# Patient Record
Sex: FEMALE | Race: WHITE | NOT HISPANIC OR LATINO | Employment: FULL TIME
[De-identification: names, ages, dates, MRNs, and addresses within clinical notes are randomized per-mention and may not be internally consistent; named-entity substitution may affect disease eponyms.]

---

## 2019-03-22 ENCOUNTER — HEALTH MAINTENANCE LETTER (OUTPATIENT)
Age: 49
End: 2019-03-22

## 2019-05-22 ENCOUNTER — OFFICE VISIT (OUTPATIENT)
Dept: URGENT CARE | Facility: URGENT CARE | Age: 49
End: 2019-05-22
Payer: COMMERCIAL

## 2019-05-22 VITALS
OXYGEN SATURATION: 98 % | HEART RATE: 76 BPM | SYSTOLIC BLOOD PRESSURE: 110 MMHG | DIASTOLIC BLOOD PRESSURE: 68 MMHG | TEMPERATURE: 98.3 F | RESPIRATION RATE: 18 BRPM

## 2019-05-22 DIAGNOSIS — S05.01XA ABRASION OF RIGHT CORNEA, INITIAL ENCOUNTER: Primary | ICD-10-CM

## 2019-05-22 PROCEDURE — 99213 OFFICE O/P EST LOW 20 MIN: CPT | Performed by: PHYSICIAN ASSISTANT

## 2019-05-22 RX ORDER — OFLOXACIN 3 MG/ML
SOLUTION/ DROPS OPHTHALMIC
Qty: 4 ML | Refills: 0 | Status: SHIPPED | OUTPATIENT
Start: 2019-05-22 | End: 2019-05-29

## 2019-05-22 NOTE — PROGRESS NOTES
SUBJECTIVE:  Chief Complaint:   Chief Complaint   Patient presents with     Urgent Care     Eye Problem     R eye pain scratched      History of Present Illness:  Luz Sharpe is a 48 year old female who presents complaining of mild right eyes pain for 1 day(s).   Onset/timing: sudden.    Associated Signs and Symptoms: none  Treatment measures tried include: flushed with water   Contact wearer : Yes    No past medical history on file.  No current outpatient medications on file.        ROS:  Review of systems negative except as stated above.    OBJECTIVE:  /68   Pulse 76   Temp 98.3  F (36.8  C)   Resp 18   SpO2 98%   General: no acute distress  Eye exam: right eye normal lid, conjunctiva, cornea, pupil and fundus, left eye abnormal findings: flourscein uptake .  Ears: normal canals, TMs bilaterally, normal TM mobility  Nose: NORMAL - no drainage, turbinates normal in size.  Neck: supple, non-tender, free range of motion, no adenopathy  Heart: NORMAL - regular rate and rhythm without murmur.  Lungs: normal and clear to auscultation    ASSESSMENT:  (S05.01XA) Abrasion of right cornea, initial encounter  (primary encounter diagnosis)  Comment: Covering for contact wearing  Plan: ofloxacin (OCUFLOX) 0.3 % ophthalmic solution  Follow up with ophtho tomorrow    Patient Instructions   Follow up with optometry if not improving in 24-36 hours      Patient Education     Corneal Abrasion    You have received a scratch or scrape (abrasion) to your cornea. The cornea is the clear part in the front of the eye. This sensitive area is very painful when injured. You may make tears frequently, and your vision may be blurry until the injury heals. You may be sensitive to light.  This part of the body heals quickly. You can expect the pain to go away within 24 to 48 hours. If the abrasion is large or deep, your doctor may apply an eye patch, although this is not always done. An antibiotic ointment or eye drops may also be  used to prevent infection.  Numbing drops may be used to relieve the pain temporarily so that your eyes can be examined. But these drops can t be prescribed for home use because that would prevent healing and lead to more serious problems. Also, if you can t feel your eye, there is a chance of accidentally injuring it further without knowing it.  Home care    A cold pack may be applied over the eye (or eye patch) for 20 minutes at a time, to reduce pain. To make a cold pack, put ice cubes in a plastic bag that seals at the top. Wrap the bag in a clean, thin towel or cloth.    You may use acetaminophen or ibuprofen to control pain, unless another pain medicine was prescribed. Note: If you have chronic liver or kidney disease or have ever had a stomach ulcer or GI bleeding, talk with your doctor before using these medicines.    Rest your eyes and don t read until symptoms are gone.    If you use contact lenses, don t wear them until all symptoms are gone.    If your vision is affected by the corneal abrasion or if an eye patch was applied, don t drive a motor vehicle or operate machinery until all symptoms are gone. You may have trouble judging distances using only one eye.    If your eyes are sensitive to light, try wearing sunglasses, or stay indoors until symptoms go away.  Follow-up care  Follow up with your healthcare provider, or as advised.    If no patch was put on your eye and the pain continues for more than 48 hours, you should have another exam. Contact your healthcare provider to arrange this.    If your eye was patched and you were asked to remove the patch yourself, see your healthcare provider. Contact your healthcare provider if you still have pain after the patch is removed.    If you were given a return appointment for patch removal and re-examination, be sure to keep the appointment. Leaving the patch in place longer than advised could be harmful.  When to seek medical advice  Call your healthcare  provider right away if any of these occur.    Increasing eye pain or pain that does not improve after 24 hours    Discharge from the eye    Increasing redness of the eye or swelling of the eyelids    Worsening vision    Symptoms get worse after the abrasion has healed  Date Last Reviewed: 7/1/2017 2000-2018 [employer]. [address] [place] [id number]. All rights reserved. This information is not intended as a substitute for professional medical care. Always follow your healthcare professional's instructions.

## 2019-05-23 NOTE — PATIENT INSTRUCTIONS
Follow up with optometry if not improving in 24-36 hours      Patient Education     Corneal Abrasion    You have received a scratch or scrape (abrasion) to your cornea. The cornea is the clear part in the front of the eye. This sensitive area is very painful when injured. You may make tears frequently, and your vision may be blurry until the injury heals. You may be sensitive to light.  This part of the body heals quickly. You can expect the pain to go away within 24 to 48 hours. If the abrasion is large or deep, your doctor may apply an eye patch, although this is not always done. An antibiotic ointment or eye drops may also be used to prevent infection.  Numbing drops may be used to relieve the pain temporarily so that your eyes can be examined. But these drops can t be prescribed for home use because that would prevent healing and lead to more serious problems. Also, if you can t feel your eye, there is a chance of accidentally injuring it further without knowing it.  Home care    A cold pack may be applied over the eye (or eye patch) for 20 minutes at a time, to reduce pain. To make a cold pack, put ice cubes in a plastic bag that seals at the top. Wrap the bag in a clean, thin towel or cloth.    You may use acetaminophen or ibuprofen to control pain, unless another pain medicine was prescribed. Note: If you have chronic liver or kidney disease or have ever had a stomach ulcer or GI bleeding, talk with your doctor before using these medicines.    Rest your eyes and don t read until symptoms are gone.    If you use contact lenses, don t wear them until all symptoms are gone.    If your vision is affected by the corneal abrasion or if an eye patch was applied, don t drive a motor vehicle or operate machinery until all symptoms are gone. You may have trouble judging distances using only one eye.    If your eyes are sensitive to light, try wearing sunglasses, or stay indoors until symptoms go away.  Follow-up  care  Follow up with your healthcare provider, or as advised.    If no patch was put on your eye and the pain continues for more than 48 hours, you should have another exam. Contact your healthcare provider to arrange this.    If your eye was patched and you were asked to remove the patch yourself, see your healthcare provider. Contact your healthcare provider if you still have pain after the patch is removed.    If you were given a return appointment for patch removal and re-examination, be sure to keep the appointment. Leaving the patch in place longer than advised could be harmful.  When to seek medical advice  Call your healthcare provider right away if any of these occur.    Increasing eye pain or pain that does not improve after 24 hours    Discharge from the eye    Increasing redness of the eye or swelling of the eyelids    Worsening vision    Symptoms get worse after the abrasion has healed  Date Last Reviewed: 7/1/2017 2000-2018 [employer]. [address] [place] [address], [place] [address]. All rights reserved. This information is not intended as a substitute for professional medical care. Always follow your healthcare professional's instructions.

## 2019-11-04 ENCOUNTER — HEALTH MAINTENANCE LETTER (OUTPATIENT)
Age: 49
End: 2019-11-04

## 2020-11-16 ENCOUNTER — HEALTH MAINTENANCE LETTER (OUTPATIENT)
Age: 50
End: 2020-11-16

## 2021-01-01 ENCOUNTER — TRANSFERRED RECORDS (OUTPATIENT)
Dept: MULTI SPECIALTY CLINIC | Facility: CLINIC | Age: 51
End: 2021-01-01

## 2021-01-15 ENCOUNTER — HEALTH MAINTENANCE LETTER (OUTPATIENT)
Age: 51
End: 2021-01-15

## 2021-03-04 ENCOUNTER — OFFICE VISIT (OUTPATIENT)
Dept: FAMILY MEDICINE | Facility: CLINIC | Age: 51
End: 2021-03-04
Payer: COMMERCIAL

## 2021-03-04 VITALS
TEMPERATURE: 98 F | DIASTOLIC BLOOD PRESSURE: 76 MMHG | WEIGHT: 147 LBS | HEART RATE: 77 BPM | OXYGEN SATURATION: 100 % | SYSTOLIC BLOOD PRESSURE: 108 MMHG

## 2021-03-04 DIAGNOSIS — R22.1 NECK MASS: Primary | ICD-10-CM

## 2021-03-04 LAB
BASOPHILS # BLD AUTO: 0 10E9/L (ref 0–0.2)
BASOPHILS NFR BLD AUTO: 0.4 %
DIFFERENTIAL METHOD BLD: NORMAL
EOSINOPHIL # BLD AUTO: 0.1 10E9/L (ref 0–0.7)
EOSINOPHIL NFR BLD AUTO: 1.5 %
ERYTHROCYTE [DISTWIDTH] IN BLOOD BY AUTOMATED COUNT: 14 % (ref 10–15)
HCT VFR BLD AUTO: 39 % (ref 35–47)
HGB BLD-MCNC: 13 G/DL (ref 11.7–15.7)
LYMPHOCYTES # BLD AUTO: 1.9 10E9/L (ref 0.8–5.3)
LYMPHOCYTES NFR BLD AUTO: 26.9 %
MCH RBC QN AUTO: 29.9 PG (ref 26.5–33)
MCHC RBC AUTO-ENTMCNC: 33.3 G/DL (ref 31.5–36.5)
MCV RBC AUTO: 90 FL (ref 78–100)
MONOCYTES # BLD AUTO: 0.4 10E9/L (ref 0–1.3)
MONOCYTES NFR BLD AUTO: 6.1 %
NEUTROPHILS # BLD AUTO: 4.7 10E9/L (ref 1.6–8.3)
NEUTROPHILS NFR BLD AUTO: 65.1 %
PLATELET # BLD AUTO: 267 10E9/L (ref 150–450)
RBC # BLD AUTO: 4.35 10E12/L (ref 3.8–5.2)
WBC # BLD AUTO: 7.2 10E9/L (ref 4–11)

## 2021-03-04 PROCEDURE — 36415 COLL VENOUS BLD VENIPUNCTURE: CPT | Performed by: NURSE PRACTITIONER

## 2021-03-04 PROCEDURE — 99203 OFFICE O/P NEW LOW 30 MIN: CPT | Performed by: NURSE PRACTITIONER

## 2021-03-04 PROCEDURE — 85025 COMPLETE CBC W/AUTO DIFF WBC: CPT | Performed by: NURSE PRACTITIONER

## 2021-03-04 RX ORDER — DESOGESTREL AND ETHINYL ESTRADIOL 0.15-0.03
1 KIT ORAL DAILY
COMMUNITY
Start: 2021-02-08 | End: 2022-11-23

## 2021-03-04 RX ORDER — VALACYCLOVIR HYDROCHLORIDE 500 MG/1
500 TABLET, FILM COATED ORAL DAILY
COMMUNITY
Start: 2021-02-08 | End: 2022-11-23

## 2021-03-04 RX ORDER — EPINEPHRINE 0.3 MG/.3ML
INJECTION SUBCUTANEOUS
COMMUNITY
Start: 2021-02-19 | End: 2024-04-22

## 2021-03-04 NOTE — PROGRESS NOTES
Assessment & Plan     Neck mass  May be reactive lymphadenopathy secondary to moderna covid vaccine  - CBC with platelets and differential  - US Head Neck Soft Tissue; Future      JOANNE Armstrong CNP  M [facility] CHARLES Escobar is a 50 year old who presents for the following health issues    HPI   Concern - bump on right collar bone  Onset: noticed it on Tuesday 3/2  Description:  bump on right collar bone  Progression of Symptoms:  same  Accompanying Signs & Symptoms: none   Previous history of similar problem: no  Therapies tried and outcome: elizabeth Escobar had her first moderna vaccine about 10 days ago.  Had no chills or fever, no headache     Review of Systems   Constitutional, HEENT, cardiovascular, pulmonary, gi and gu systems are negative, except as otherwise noted.      Objective    /76 (BP Location: Left arm, Patient Position: Sitting)   Pulse 77   Temp 98  F (36.7  C) (Temporal)   Wt 66.7 kg (147 lb)   SpO2 100%   There is no height or weight on file to calculate BMI.     Physical Exam     GENERAL: healthy, alert and no distress  EYES: Eyes grossly normal to inspection, PERRL and conjunctivae and sclerae normal  NECK: right posterior <1cm non tender lymphnode, right anterior <1cm nontender node, in right mid supraclavicular area is a 3-4cm superficial nontender fluid like mass that does not seem to be circumscribed or formed, no masses, or scars and thyroid normal to palpation  MS: no gross musculoskeletal defects noted, no edema      Lab pending

## 2021-05-13 ENCOUNTER — E-VISIT (OUTPATIENT)
Dept: URGENT CARE | Facility: URGENT CARE | Age: 51
End: 2021-05-13
Payer: COMMERCIAL

## 2021-05-13 DIAGNOSIS — Z20.822 CLOSE EXPOSURE TO 2019 NOVEL CORONAVIRUS: Primary | ICD-10-CM

## 2021-05-13 PROCEDURE — 99421 OL DIG E/M SVC 5-10 MIN: CPT | Performed by: PHYSICIAN ASSISTANT

## 2021-05-13 NOTE — PATIENT INSTRUCTIONS
"  Dear Luz Sharpe,    Based on your exposure to COVID-19 (coronavirus), we would like to test you for this virus. I have placed an order for this test.The best time for testing is 5-7 days after the exposure.    How to schedule:  Go to your [facility] home page and scroll down to the section that says  You have an appointment that needs to be scheduled  and click the large green button that says  Schedule Now  and follow the steps to find the next available opening.     If you are unable to complete these [facility] scheduling steps, please call [phone] to schedule your testing.     Return to work/school/ guidance:   For people with high risk exposures outside the home    Please let your workplace manager and staffing office know when your quarantine ends.     We can not give you an exact date as it depends on the information below. You can calculate this on your own or work with your manager/staffing office to calculate this. (For example if you were exposed on 10/4, you would have to quarantine for 14 full days. That would be through 10/18. You could return on 10/19.)    Quarantine Guidelines:  Patients (\"contacts\") who have been in close prolonged contact of an infected person(s) (within six feet for at least 15 minutes within a 24 hour period), and remain asymptomatic should enter quarantine based on the following options:    14-day quarantine period (this remains the CDC recommendation for the greatest protection against spread of COVID-19) OR    Minimum 7-day quarantine with negative RT-PCR test collected on day 5 or later OR    10-day quarantine with no test  Quarantine Guideline exceptions are as follows:    People who have been fully vaccinated do not need to quarantine if the exposure was at least 2 weeks after the last vaccination. This includes vaccinated health care workers.    Not fully vaccinated and unvaccinated Individuals who work in health care, congregate care, or congregate living " should be off work for 14 days from their last date of exposure. Community activities for this group can be resumed based on options above. Fully vaccinated individuals in this group do not need to quarantine from work after exposure.    Not fully vaccinated and unvaccinated people whose high-risk exposure was a household member should always quarantine for 14 days from their last date of exposure. Fully vaccinated people in this category do not need to quarantine.    Not fully vaccinated or unvaccinated residents of congregate care and congregate living settings should always quarantine for 14 days from their last date of exposure. Fully vaccinated residents do not need to quarantine.  Note: If you have ongoing exposure to the covid positive person, this quarantine period may be more than 14 days. (For example, if you are continued to be exposed to your child who tested positive and cannot isolate from them, then the quarantine of 7-14 days can't start until your child is no longer contagious. This is typically 10 days from onset of the child's symptoms. So the total duration may be 17-24 days in this case.)    You should continue symptom monitoring until day 14 post-exposure. If you develop signs or symptoms of COVID-19, isolate and get tested (even if you have been tested already).    How to quarantine:   Stay home and away from others. Don't go to school or anywhere else. Generally quarantine means staying home from work but there are some exceptions to this. Please contact your workplace.  No hugging, kissing or shaking hands.  Don't let anyone visit.  Cover your mouth and nose with a mask, tissue or washcloth to avoid spreading germs.  Wash your hands and face often. Use soap and water.    What are the symptoms of COVID-19?  The most common symptoms are cough, fever and trouble breathing. Less common symptoms include headache, body aches, fatigue (feeling very tired), chills, sore throat, stuffy or runny nose,  diarrhea (loose poop), loss of taste or smell, belly pain, and nausea or vomiting (feeling sick to your stomach or throwing up).  After 14 days, if you have still don't have symptoms, you likely don't have this virus.  If you develop symptoms, follow these guidelines.  If you're normally healthy: Please start another eVisit.  If you have a serious health problem (like cancer, heart failure, an organ transplant or kidney disease): Call your specialty clinic. Let them know that you might have COVID-19.    Where can I get more information?  [facility] - About COVID-19: [email or web address]  CDC - What to Do If You're Sick: www.cdc.gov/coronavirus/2019-ncov/about/steps-when-sick.html  CDC - Ending Home Isolation: www.cdc.gov/coronavirus/2019-ncov/hcp/disposition-in-home-patients.html  CDC - Caring for Someone: www.cdc.gov/coronavirus/2019-ncov/if-you-are-sick/care-for-someone.html  [facility] clinical trials (COVID-19 research studies): [email or web address]-clinical-trials  Below are the COVID-19 hotlines at the [place] [facility] ([facility]). Interpreters are available.  For health questions: Call 784-942-4323 or 1-199.784.5544 (7 a.m. to 7 p.m.)  For questions about schools and childcare: Call 226-324-4929 or 1-154.798.2596 (7 a.m. to 7 p.m.)

## 2021-05-25 DIAGNOSIS — Z20.822 CLOSE EXPOSURE TO 2019 NOVEL CORONAVIRUS: ICD-10-CM

## 2021-05-25 LAB
SARS-COV-2 RNA RESP QL NAA+PROBE: NORMAL
SPECIMEN SOURCE: NORMAL

## 2021-05-25 PROCEDURE — U0003 INFECTIOUS AGENT DETECTION BY NUCLEIC ACID (DNA OR RNA); SEVERE ACUTE RESPIRATORY SYNDROME CORONAVIRUS 2 (SARS-COV-2) (CORONAVIRUS DISEASE [COVID-19]), AMPLIFIED PROBE TECHNIQUE, MAKING USE OF HIGH THROUGHPUT TECHNOLOGIES AS DESCRIBED BY CMS-2020-01-R: HCPCS | Performed by: PHYSICIAN ASSISTANT

## 2021-05-25 PROCEDURE — U0005 INFEC AGEN DETEC AMPLI PROBE: HCPCS | Performed by: PHYSICIAN ASSISTANT

## 2021-05-26 LAB
LABORATORY COMMENT REPORT: NORMAL
SARS-COV-2 RNA RESP QL NAA+PROBE: NEGATIVE
SPECIMEN SOURCE: NORMAL

## 2021-06-26 ENCOUNTER — OFFICE VISIT (OUTPATIENT)
Dept: URGENT CARE | Facility: URGENT CARE | Age: 51
End: 2021-06-26
Payer: COMMERCIAL

## 2021-06-26 VITALS
TEMPERATURE: 98.7 F | SYSTOLIC BLOOD PRESSURE: 107 MMHG | OXYGEN SATURATION: 98 % | DIASTOLIC BLOOD PRESSURE: 68 MMHG | HEART RATE: 70 BPM | WEIGHT: 145.1 LBS

## 2021-06-26 DIAGNOSIS — R30.0 DYSURIA: ICD-10-CM

## 2021-06-26 DIAGNOSIS — R82.90 NONSPECIFIC FINDING ON EXAMINATION OF URINE: ICD-10-CM

## 2021-06-26 DIAGNOSIS — N39.0 URINARY TRACT INFECTION WITHOUT HEMATURIA, SITE UNSPECIFIED: Primary | ICD-10-CM

## 2021-06-26 LAB
ALBUMIN UR-MCNC: ABNORMAL MG/DL
APPEARANCE UR: ABNORMAL
BACTERIA #/AREA URNS HPF: ABNORMAL /HPF
BILIRUB UR QL STRIP: NEGATIVE
COLOR UR AUTO: YELLOW
GLUCOSE UR STRIP-MCNC: NEGATIVE MG/DL
HGB UR QL STRIP: ABNORMAL
KETONES UR STRIP-MCNC: ABNORMAL MG/DL
LEUKOCYTE ESTERASE UR QL STRIP: ABNORMAL
NITRATE UR QL: NEGATIVE
NON-SQ EPI CELLS #/AREA URNS LPF: ABNORMAL /LPF
PH UR STRIP: 5 PH (ref 5–7)
RBC #/AREA URNS AUTO: ABNORMAL /HPF
SOURCE: ABNORMAL
SP GR UR STRIP: 1.02 (ref 1–1.03)
UROBILINOGEN UR STRIP-ACNC: 0.2 EU/DL (ref 0.2–1)
WBC #/AREA URNS AUTO: ABNORMAL /HPF

## 2021-06-26 PROCEDURE — 87086 URINE CULTURE/COLONY COUNT: CPT | Performed by: PHYSICIAN ASSISTANT

## 2021-06-26 PROCEDURE — 87088 URINE BACTERIA CULTURE: CPT | Performed by: PHYSICIAN ASSISTANT

## 2021-06-26 PROCEDURE — 87186 SC STD MICRODIL/AGAR DIL: CPT | Performed by: PHYSICIAN ASSISTANT

## 2021-06-26 PROCEDURE — 81001 URINALYSIS AUTO W/SCOPE: CPT | Performed by: FAMILY MEDICINE

## 2021-06-26 PROCEDURE — 87591 N.GONORRHOEAE DNA AMP PROB: CPT | Performed by: FAMILY MEDICINE

## 2021-06-26 PROCEDURE — 87491 CHLMYD TRACH DNA AMP PROBE: CPT | Performed by: FAMILY MEDICINE

## 2021-06-26 PROCEDURE — 99213 OFFICE O/P EST LOW 20 MIN: CPT | Performed by: PHYSICIAN ASSISTANT

## 2021-06-26 RX ORDER — CIPROFLOXACIN 500 MG/1
500 TABLET, FILM COATED ORAL 2 TIMES DAILY
Qty: 10 TABLET | Refills: 0 | Status: SHIPPED | OUTPATIENT
Start: 2021-06-26 | End: 2021-07-01

## 2021-06-26 NOTE — PROGRESS NOTES
SUBJECTIVE:   Luz Sharpe is a 50 year old female who  presents today for a possible UTI. Symptoms of dysuria, frequency, burning and suprapubic pain and pressure have been going on for 2day(s).  Hematuria no.  gradual onsetand mild.  There is no history of fever, chills, nausea or vomiting.  No history of vaginal  discharge. This patient does  have a history of urinary tract infections. Patient denies long duration, rigors, flank pain, temperature > 101 degrees F., Vomiting, significant nausea or diarrhea, taking Coumadin and GFR less than 30 within the last year or vaginal discharge, vaginal odor and vaginal itching     PMH generally healthy     Current Outpatient Medications   Medication Sig Dispense Refill     APRI 0.15-30 MG-MCG tablet Take 1 tablet by mouth daily       EPINEPHrine (ANY BX GENERIC EQUIV) 0.3 MG/0.3ML injection 2-pack USE AS DIRECTED FOR 1 DOSE. MAY REPEAT IF NEEDED       valACYclovir (VALTREX) 500 MG tablet Take 500 mg by mouth daily       Social History     Tobacco Use     Smoking status: Never Smoker     Smokeless tobacco: Never Used   Substance Use Topics     Alcohol use: Not on file       ROS:   Review of systems negative except as stated above.    OBJECTIVE:  /68   Pulse 70   Temp 98.7  F (37.1  C)   Wt 65.8 kg (145 lb 1.6 oz)   SpO2 98%   GENERAL APPEARANCE: healthy, alert and no distress  RESP: lungs clear to auscultation - no rales, rhonchi or wheezes  CV: regular rates and rhythm, normal S1 S2, no murmur noted  ABDOMEN:  soft, nontender, no HSM or masses and bowel sounds normal  BACK: No CVA tenderness  SKIN: no suspicious lesions or rashes    Results for orders placed or performed in visit on 06/26/21   *UA reflex to Microscopic and Culture ([place] and [facility] (except [place] and [place])     Status: Abnormal    Specimen: Midstream Urine   Result Value Ref Range    Color Urine Yellow     Appearance Urine Slightly Cloudy     Glucose Urine Negative  NEG^Negative mg/dL    Bilirubin Urine Negative NEG^Negative    Ketones Urine Trace (A) NEG^Negative mg/dL    Specific Gravity Urine 1.025 1.003 - 1.035    Blood Urine Moderate (A) NEG^Negative    pH Urine 5.0 5.0 - 7.0 pH    Protein Albumin Urine Trace (A) NEG^Negative mg/dL    Urobilinogen Urine 0.2 0.2 - 1.0 EU/dL    Nitrite Urine Negative NEG^Negative    Leukocyte Esterase Urine Moderate (A) NEG^Negative    Source Midstream Urine    Urine Microscopic     Status: Abnormal   Result Value Ref Range    WBC Urine  (A) OTO5^0 - 5 /HPF    RBC Urine 5-10 (A) OTO2^O - 2 /HPF    Squamous Epithelial /LPF Urine Few FEW^Few /LPF    Bacteria Urine Many (A) NEG^Negative /HPF         ASSESSMENT:   Lower, uncomplicated urinary tract infection.    PLAN:  GC pending and Cipro as directed   Drink plenty of fluids.  Prevention and treatment of UTI's discussed.Signs and symptoms of pyelonephritis mentioned.  Follow up with primary care physician if not improving

## 2021-06-28 LAB
C TRACH DNA SPEC QL NAA+PROBE: NEGATIVE
N GONORRHOEA DNA SPEC QL NAA+PROBE: NEGATIVE
SPECIMEN SOURCE: NORMAL
SPECIMEN SOURCE: NORMAL

## 2021-06-29 LAB
BACTERIA SPEC CULT: ABNORMAL
Lab: ABNORMAL
SPECIMEN SOURCE: ABNORMAL

## 2021-07-02 ENCOUNTER — TELEPHONE (OUTPATIENT)
Dept: URGENT CARE | Facility: URGENT CARE | Age: 51
End: 2021-07-02

## 2021-07-02 DIAGNOSIS — N30.01 ACUTE CYSTITIS WITH HEMATURIA: Primary | ICD-10-CM

## 2021-07-02 RX ORDER — CIPROFLOXACIN 500 MG/1
500 TABLET, FILM COATED ORAL 2 TIMES DAILY
Qty: 6 TABLET | Refills: 0 | Status: SHIPPED | OUTPATIENT
Start: 2021-07-02 | End: 2021-07-05

## 2021-07-02 NOTE — TELEPHONE ENCOUNTER
"Called and LVM for patient that medication was sent to pharmacy. If patient calls back please relay the following per provider \"  Ciprofloxacin was sent to the [facility] in [facility], \" if patient has any questions to call us back. Joya Vizcarra, ISA on 7/2/2021 at 2:51 PM   "

## 2021-07-02 NOTE — TELEPHONE ENCOUNTER
I will e-prescribe a three-day course of the Ciprofloxacin 500 mg PO BID to the patient's pharmacy ([facility] in [place] on [facility])    Please notify patient of this treatment plan.      Tristin Hernandez MD

## 2021-07-02 NOTE — TELEPHONE ENCOUNTER
Patient called following up from her urgent care visit on   6/26/21. After starting on the ciprofloxacin patient's UTI symptoms went away. Yesterday patient thought her UTI symptoms might have come back. Patient finished her course of antibiotics yesterday. Today patient feels fine.     Requested that patient be seen again for UTI symptoms if symptoms return. Patient is leaving for a trip tomorrow and does not want to be seen.    Patient has has not tried AZO yet. Patient worries that the antibiotic did not fully kill the bacteria and she wonders if she needs a longer course of the antibiotic.     Mandy Oconnor RN on 7/2/2021 at 2:15 PM

## 2021-09-18 ENCOUNTER — HEALTH MAINTENANCE LETTER (OUTPATIENT)
Age: 51
End: 2021-09-18

## 2021-11-03 ENCOUNTER — APPOINTMENT (OUTPATIENT)
Dept: URBAN - METROPOLITAN AREA CLINIC 260 | Age: 51
Setting detail: DERMATOLOGY
End: 2021-11-04

## 2021-11-03 VITALS — WEIGHT: 145 LBS | HEIGHT: 68 IN

## 2021-11-03 DIAGNOSIS — L57.8 OTHER SKIN CHANGES DUE TO CHRONIC EXPOSURE TO NONIONIZING RADIATION: ICD-10-CM

## 2021-11-03 DIAGNOSIS — D49.2 NEOPLASM OF UNSPECIFIED BEHAVIOR OF BONE, SOFT TISSUE, AND SKIN: ICD-10-CM

## 2021-11-03 PROCEDURE — 99202 OFFICE O/P NEW SF 15 MIN: CPT | Mod: 25

## 2021-11-03 PROCEDURE — 11102 TANGNTL BX SKIN SINGLE LES: CPT

## 2021-11-03 PROCEDURE — OTHER BIOPSY BY SHAVE METHOD: OTHER

## 2021-11-03 PROCEDURE — OTHER COUNSELING: OTHER

## 2021-11-03 PROCEDURE — OTHER MIPS QUALITY: OTHER

## 2021-11-03 ASSESSMENT — LOCATION SIMPLE DESCRIPTION DERM
LOCATION SIMPLE: LEFT NOSE
LOCATION SIMPLE: LEFT FOREHEAD

## 2021-11-03 ASSESSMENT — LOCATION ZONE DERM
LOCATION ZONE: NOSE
LOCATION ZONE: FACE

## 2021-11-03 ASSESSMENT — LOCATION DETAILED DESCRIPTION DERM
LOCATION DETAILED: LEFT MEDIAL FOREHEAD
LOCATION DETAILED: LEFT NASAL ALA

## 2021-11-03 NOTE — PROCEDURE: COUNSELING
Detail Level: Zone
Sunscreen Recommendations: SPF 30-50 daily, year round on face.  \\nBody sunscreen as needed if sun exposure anticipated,  Reapply every 90 minutes if exposure is prolonged.

## 2021-11-03 NOTE — PROCEDURE: BIOPSY BY SHAVE METHOD
Notification Instructions: Patient will be notified of biopsy results. However, patient instructed to call the office if not contacted within 2 weeks.
Validate That Anesthesia Is Not 0: No
Curettage Text: The wound bed was treated with curettage after the biopsy was performed.
X Size Of Lesion In Cm: 0
Information: Selecting Yes will display possible errors in your note based on the variables you have selected. This validation is only offered as a suggestion for you. PLEASE NOTE THAT THE VALIDATION TEXT WILL BE REMOVED WHEN YOU FINALIZE YOUR NOTE. IF YOU WANT TO FAX A PRELIMINARY NOTE YOU WILL NEED TO TOGGLE THIS TO 'NO' IF YOU DO NOT WANT IT IN YOUR FAXED NOTE.
Anesthesia Type: 1% lidocaine with epinephrine
Consent: Written consent was obtained and risks were reviewed including but not limited to scarring, infection, bleeding, scabbing, incomplete removal, nerve damage and allergy to anesthesia.
Dressing: bandage
Biopsy Type: H and E
Silver Nitrate Text: The wound bed was treated with silver nitrate after the biopsy was performed.
Biopsy Method: Dermablade
Render Post-Care Instructions In Note?: yes
Post-Care Instructions: I reviewed with the patient in detail post-care instructions. Patient is to keep the biopsy site dry overnight, and then apply bacitracin twice daily until healed. Patient may apply hydrogen peroxide soaks to remove any crusting.
Depth Of Biopsy: dermis
Wound Care: Petrolatum
Electrodesiccation And Curettage Text: The wound bed was treated with electrodesiccation and curettage after the biopsy was performed.
Hemostasis: Drysol
Detail Level: Detailed
Electrodesiccation Text: The wound bed was treated with electrodesiccation after the biopsy was performed.
Anesthesia Volume In Cc (Will Not Render If 0): 0.3
Billing Type: Third-Party Bill
Type Of Destruction Used: Curettage
Cryotherapy Text: The wound bed was treated with cryotherapy after the biopsy was performed.

## 2021-11-03 NOTE — PROCEDURE: MIPS QUALITY
Quality 110: Preventive Care And Screening: Influenza Immunization: Influenza immunization was not ordered or administered, reason not given
Detail Level: Detailed
Quality 431: Preventive Care And Screening: Unhealthy Alcohol Use - Screening: Patient not identified as an unhealthy alcohol user when screened for unhealthy alcohol use using a systematic screening method
Quality 130: Documentation Of Current Medications In The Medical Record: Current Medications Documented
Quality 226: Preventive Care And Screening: Tobacco Use: Screening And Cessation Intervention: Patient screened for tobacco use and is an ex/non-smoker
18-Dec-2020 16:23

## 2021-11-16 ENCOUNTER — APPOINTMENT (OUTPATIENT)
Dept: URBAN - METROPOLITAN AREA CLINIC 260 | Age: 51
Setting detail: DERMATOLOGY
End: 2021-11-17

## 2021-11-16 VITALS — WEIGHT: 145 LBS | HEIGHT: 68 IN

## 2021-11-16 DIAGNOSIS — L82.1 OTHER SEBORRHEIC KERATOSIS: ICD-10-CM

## 2021-11-16 DIAGNOSIS — D18.0 HEMANGIOMA: ICD-10-CM

## 2021-11-16 DIAGNOSIS — D22 MELANOCYTIC NEVI: ICD-10-CM

## 2021-11-16 DIAGNOSIS — L70.0 ACNE VULGARIS: ICD-10-CM

## 2021-11-16 DIAGNOSIS — L81.4 OTHER MELANIN HYPERPIGMENTATION: ICD-10-CM

## 2021-11-16 DIAGNOSIS — Z71.89 OTHER SPECIFIED COUNSELING: ICD-10-CM

## 2021-11-16 DIAGNOSIS — L65.9 NONSCARRING HAIR LOSS, UNSPECIFIED: ICD-10-CM

## 2021-11-16 PROBLEM — D18.01 HEMANGIOMA OF SKIN AND SUBCUTANEOUS TISSUE: Status: ACTIVE | Noted: 2021-11-16

## 2021-11-16 PROBLEM — D22.5 MELANOCYTIC NEVI OF TRUNK: Status: ACTIVE | Noted: 2021-11-16

## 2021-11-16 PROCEDURE — OTHER ADDITIONAL NOTES: OTHER

## 2021-11-16 PROCEDURE — OTHER PRESCRIPTION MEDICATION MANAGEMENT: OTHER

## 2021-11-16 PROCEDURE — OTHER ORDER TESTS: OTHER

## 2021-11-16 PROCEDURE — OTHER COUNSELING: OTHER

## 2021-11-16 PROCEDURE — 36415 COLL VENOUS BLD VENIPUNCTURE: CPT

## 2021-11-16 PROCEDURE — OTHER VENIPUNCTURE: OTHER

## 2021-11-16 PROCEDURE — OTHER PRESCRIPTION: OTHER

## 2021-11-16 PROCEDURE — 99214 OFFICE O/P EST MOD 30 MIN: CPT | Mod: 25

## 2021-11-16 RX ORDER — SPIRONOLACTONE 25 MG/1
TABLET, FILM COATED ORAL BID
Qty: 60 | Refills: 3 | Status: ERX | COMMUNITY
Start: 2021-11-16

## 2021-11-16 ASSESSMENT — LOCATION SIMPLE DESCRIPTION DERM
LOCATION SIMPLE: RIGHT UPPER BACK
LOCATION SIMPLE: CHIN
LOCATION SIMPLE: LEFT UPPER ARM
LOCATION SIMPLE: UPPER BACK

## 2021-11-16 ASSESSMENT — LOCATION DETAILED DESCRIPTION DERM
LOCATION DETAILED: SUPERIOR THORACIC SPINE
LOCATION DETAILED: RIGHT SUPERIOR UPPER BACK
LOCATION DETAILED: RIGHT INFERIOR MEDIAL UPPER BACK
LOCATION DETAILED: LEFT ANTERIOR DISTAL UPPER ARM
LOCATION DETAILED: LEFT MENTAL CREASE
LOCATION DETAILED: RIGHT MEDIAL UPPER BACK

## 2021-11-16 ASSESSMENT — LOCATION ZONE DERM
LOCATION ZONE: FACE
LOCATION ZONE: ARM
LOCATION ZONE: TRUNK

## 2021-11-16 ASSESSMENT — SEVERITY ASSESSMENT OVERALL AMONG ALL PATIENTS
IN YOUR EXPERIENCE, AMONG ALL PATIENTS YOU HAVE SEEN WITH THIS CONDITION, HOW SEVERE IS THIS PATIENT'S CONDITION?: FEW INFLAMMATORY LESIONS, SOME NONINFLAMMATORY

## 2021-11-16 NOTE — PROCEDURE: ADDITIONAL NOTES
Additional Notes: Patient inquired about PRP for hair loss and referred to Ligia Mederos in [facility] office for further diagnosis evaluation and treatment. Additional Notes: Patient inquired about PRP for hair loss and referred to Ligia Mederos in [facility] office for further diagnosis evaluation and treatment.

## 2021-11-16 NOTE — PROCEDURE: COUNSELING
High Dose Vitamin A Pregnancy And Lactation Text: High dose vitamin A therapy is contraindicated during pregnancy and breast feeding.
Tazorac Counseling:  Patient advised that medication is irritating and drying.  Patient may need to apply sparingly and wash off after an hour before eventually leaving it on overnight.  The patient verbalized understanding of the proper use and possible adverse effects of tazorac.  All of the patient's questions and concerns were addressed.
Tetracycline Counseling: Patient counseled regarding possible photosensitivity and increased risk for sunburn.  Patient instructed to avoid sunlight, if possible.  When exposed to sunlight, patients should wear protective clothing, sunglasses, and sunscreen.  The patient was instructed to call the office immediately if the following severe adverse effects occur:  hearing changes, easy bruising/bleeding, severe headache, or vision changes.  The patient verbalized understanding of the proper use and possible adverse effects of tetracycline.  All of the patient's questions and concerns were addressed. Patient understands to avoid pregnancy while on therapy due to potential birth defects.
Use Enhanced Medication Counseling?: No
Topical Clindamycin Counseling: Patient counseled that this medication may cause skin irritation or allergic reactions.  In the event of skin irritation, the patient was advised to reduce the amount of the drug applied or use it less frequently.   The patient verbalized understanding of the proper use and possible adverse effects of clindamycin.  All of the patient's questions and concerns were addressed.
Dapsone Counseling: I discussed with the patient the risks of dapsone including but not limited to hemolytic anemia, agranulocytosis, rashes, methemoglobinemia, kidney failure, peripheral neuropathy, headaches, GI upset, and liver toxicity.  Patients who start dapsone require monitoring including baseline LFTs and weekly CBCs for the first month, then every month thereafter.  The patient verbalized understanding of the proper use and possible adverse effects of dapsone.  All of the patient's questions and concerns were addressed.
High Dose Vitamin A Counseling: Side effects reviewed, pt to contact office should one occur.
Minocycline Pregnancy And Lactation Text: This medication is Pregnancy Category D and not consider safe during pregnancy. It is also excreted in breast milk.
Birth Control Pills Counseling: Birth Control Pill Counseling: I discussed with the patient the potential side effects of OCPs including but not limited to increased risk of stroke, heart attack, thrombophlebitis, deep venous thrombosis, hepatic adenomas, breast changes, GI upset, headaches, and depression.  The patient verbalized understanding of the proper use and possible adverse effects of OCPs. All of the patient's questions and concerns were addressed.
Topical Retinoid Pregnancy And Lactation Text: This medication is Pregnancy Category C. It is unknown if this medication is excreted in breast milk.
Topical Retinoid counseling:  Patient advised to apply a pea-sized amount only at bedtime and wait 30 minutes after washing their face before applying.  If too drying, patient may add a non-comedogenic moisturizer. The patient verbalized understanding of the proper use and possible adverse effects of retinoids.  All of the patient's questions and concerns were addressed.
Spironolactone Pregnancy And Lactation Text: This medication can cause feminization of the male fetus and should be avoided during pregnancy. The active metabolite is also found in breast milk.
Topical Sulfur Applications Counseling: Topical Sulfur Counseling: Patient counseled that this medication may cause skin irritation or allergic reactions.  In the event of skin irritation, the patient was advised to reduce the amount of the drug applied or use it less frequently.   The patient verbalized understanding of the proper use and possible adverse effects of topical sulfur application.  All of the patient's questions and concerns were addressed.
Detail Level: Zone
Erythromycin Counseling:  I discussed with the patient the risks of erythromycin including but not limited to GI upset, allergic reaction, drug rash, diarrhea, increase in liver enzymes, and yeast infections.
Doxycycline Pregnancy And Lactation Text: This medication is Pregnancy Category D and not consider safe during pregnancy. It is also excreted in breast milk but is considered safe for shorter treatment courses.
Bactrim Pregnancy And Lactation Text: This medication is Pregnancy Category D and is known to cause fetal risk.  It is also excreted in breast milk.
Doxycycline Counseling:  Patient counseled regarding possible photosensitivity and increased risk for sunburn.  Patient instructed to avoid sunlight, if possible.  When exposed to sunlight, patients should wear protective clothing, sunglasses, and sunscreen.  The patient was instructed to call the office immediately if the following severe adverse effects occur:  hearing changes, easy bruising/bleeding, severe headache, or vision changes.  The patient verbalized understanding of the proper use and possible adverse effects of doxycycline.  All of the patient's questions and concerns were addressed.
Tazorac Pregnancy And Lactation Text: This medication is not safe during pregnancy. It is unknown if this medication is excreted in breast milk.
Detail Level: Generalized
Azithromycin Pregnancy And Lactation Text: This medication is considered safe during pregnancy and is also secreted in breast milk.
Sarecycline Counseling: Patient advised regarding possible photosensitivity and discoloration of the teeth, skin, lips, tongue and gums.  Patient instructed to avoid sunlight, if possible.  When exposed to sunlight, patients should wear protective clothing, sunglasses, and sunscreen.  The patient was instructed to call the office immediately if the following severe adverse effects occur:  hearing changes, easy bruising/bleeding, severe headache, or vision changes.  The patient verbalized understanding of the proper use and possible adverse effects of sarecycline.  All of the patient's questions and concerns were addressed.
Benzoyl Peroxide Counseling: Patient counseled that medicine may cause skin irritation and bleach clothing.  In the event of skin irritation, the patient was advised to reduce the amount of the drug applied or use it less frequently.   The patient verbalized understanding of the proper use and possible adverse effects of benzoyl peroxide.  All of the patient's questions and concerns were addressed.
Minocycline Counseling: Patient advised regarding possible photosensitivity and discoloration of the teeth, skin, lips, tongue and gums.  Patient instructed to avoid sunlight, if possible.  When exposed to sunlight, patients should wear protective clothing, sunglasses, and sunscreen.  The patient was instructed to call the office immediately if the following severe adverse effects occur:  hearing changes, easy bruising/bleeding, severe headache, or vision changes.  The patient verbalized understanding of the proper use and possible adverse effects of minocycline.  All of the patient's questions and concerns were addressed.
Topical Sulfur Applications Pregnancy And Lactation Text: This medication is Pregnancy Category C and has an unknown safety profile during pregnancy. It is unknown if this topical medication is excreted in breast milk.
Birth Control Pills Pregnancy And Lactation Text: This medication should be avoided if pregnant and for the first 30 days post-partum.
Benzoyl Peroxide Pregnancy And Lactation Text: This medication is Pregnancy Category C. It is unknown if benzoyl peroxide is excreted in breast milk.
Isotretinoin Counseling: Patient should get monthly blood tests, not donate blood, not drive at night if vision affected, not share medication, and not undergo elective surgery for 6 months after tx completed. Side effects reviewed, pt to contact office should one occur.
Spironolactone Counseling: Patient advised regarding risks of diarrhea, abdominal pain, hyperkalemia, birth defects (for female patients), liver toxicity and renal toxicity. The patient may need blood work to monitor liver and kidney function and potassium levels while on therapy. The patient verbalized understanding of the proper use and possible adverse effects of spironolactone.  All of the patient's questions and concerns were addressed.
Topical Clindamycin Pregnancy And Lactation Text: This medication is Pregnancy Category B and is considered safe during pregnancy. It is unknown if it is excreted in breast milk.
Isotretinoin Pregnancy And Lactation Text: This medication is Pregnancy Category X and is considered extremely dangerous during pregnancy. It is unknown if it is excreted in breast milk.
Bactrim Counseling:  I discussed with the patient the risks of sulfa antibiotics including but not limited to GI upset, allergic reaction, drug rash, diarrhea, dizziness, photosensitivity, and yeast infections.  Rarely, more serious reactions can occur including but not limited to aplastic anemia, agranulocytosis, methemoglobinemia, blood dyscrasias, liver or kidney failure, lung infiltrates or desquamative/blistering drug rashes.
Dapsone Pregnancy And Lactation Text: This medication is Pregnancy Category C and is not considered safe during pregnancy or breast feeding.
Azithromycin Counseling:  I discussed with the patient the risks of azithromycin including but not limited to GI upset, allergic reaction, drug rash, diarrhea, and yeast infections.
Erythromycin Pregnancy And Lactation Text: This medication is Pregnancy Category B and is considered safe during pregnancy. It is also excreted in breast milk.

## 2021-11-16 NOTE — PROCEDURE: PRESCRIPTION MEDICATION MANAGEMENT
Plan: Ramos labs today for hair loss.  Will adjust regimen if lab testing reveals additional cause for alopecia
Statement Selected
Initiate Treatment: Spironolactone 25 mg bid
Continue Regimen: LED light comb.
Render In Strict Bullet Format?: No
Plan: Follow up in 6 months. Call the clinic if patient developed side effects.
Initiate Treatment: consider minoxidil 5% bid\\nSpironolactone 25mg bid
Detail Level: Simple

## 2021-11-16 NOTE — PROCEDURE: VENIPUNCTURE
Venipuncture Paragraph: An alcohol pad was applied to the venipuncture site. Venipuncture was performed using a butterfly needle. Pressure and a bandaid was applied to the site. No complications were noted.
Number Of Tubes Drawn: 2
Detail Level: None
Bill 89793 For Specimen Handling/Conveyance To Laboratory?: no

## 2021-11-30 ENCOUNTER — APPOINTMENT (OUTPATIENT)
Dept: URBAN - METROPOLITAN AREA CLINIC 255 | Age: 51
Setting detail: DERMATOLOGY
End: 2021-12-01

## 2021-11-30 PROBLEM — C44.321 SQUAMOUS CELL CARCINOMA OF SKIN OF NOSE: Status: ACTIVE | Noted: 2021-11-30

## 2021-11-30 PROCEDURE — 17311 MOHS 1 STAGE H/N/HF/G: CPT

## 2021-11-30 PROCEDURE — OTHER MOHS SURGERY: OTHER

## 2021-11-30 PROCEDURE — OTHER MIPS QUALITY: OTHER

## 2021-11-30 NOTE — PROCEDURE: MIPS QUALITY
Quality 226: Preventive Care And Screening: Tobacco Use: Screening And Cessation Intervention: Patient screened for tobacco use and is an ex/non-smoker
Quality 143: Oncology: Medical And Radiation- Pain Intensity Quantified: Pain severity quantified, no pain present
Quality 130: Documentation Of Current Medications In The Medical Record: Current Medications Documented
Detail Level: Detailed
Quality 110: Preventive Care And Screening: Influenza Immunization: Influenza Immunization Administered during Influenza season
Quality 431: Preventive Care And Screening: Unhealthy Alcohol Use - Screening: Patient not identified as an unhealthy alcohol user when screened for unhealthy alcohol use using a systematic screening method

## 2021-12-10 RX ORDER — SPIRONOLACTONE 25 MG/1
TABLET, FILM COATED ORAL BID
Qty: 180 | Refills: 1 | Status: ERX

## 2021-12-14 ENCOUNTER — APPOINTMENT (OUTPATIENT)
Dept: URBAN - METROPOLITAN AREA CLINIC 255 | Age: 51
Setting detail: DERMATOLOGY
End: 2021-12-15

## 2021-12-14 PROBLEM — C44.321 SQUAMOUS CELL CARCINOMA OF SKIN OF NOSE: Status: ACTIVE | Noted: 2021-12-14

## 2021-12-14 PROCEDURE — OTHER REPAIR NOTE: OTHER

## 2021-12-14 PROCEDURE — OTHER COUNSELING: OTHER

## 2021-12-14 PROCEDURE — 15260 FTH/GFT FR N/E/E/L 20 SQCM/<: CPT

## 2021-12-14 NOTE — PROCEDURE: REPAIR NOTE
Graft Donor Site Bandage (Optional-Leave Blank If You Don't Want In Note): Gelfoam, vaseline, non-adherent pad, and a pressure bandage were applied to the donor site.

## 2021-12-14 NOTE — PROCEDURE: REPAIR NOTE
[phone] ACC CALLER NAME: MEÑO RE: LEMUEL FELTON PATIENT 1945 PATIENT PCP: DR MARIE GLIPIZIDE (GLUCOTROL) 5 MG 24 HR TABLNNOT BE CUT IN HALF SO THE PHARMACIST SUGGEST A 2.5 MG PATIENT IS AT THE PHARMACY * FORMID:[id number]     Primary Defect Width (In Cm): 0.6

## 2021-12-23 ENCOUNTER — APPOINTMENT (OUTPATIENT)
Dept: URBAN - METROPOLITAN AREA CLINIC 255 | Age: 51
Setting detail: DERMATOLOGY
End: 2021-12-27

## 2021-12-23 DIAGNOSIS — Z48.02 ENCOUNTER FOR REMOVAL OF SUTURES: ICD-10-CM

## 2021-12-23 DIAGNOSIS — L98429 CHRONIC ULCER OF OTHER SPECIFIED SITES: ICD-10-CM

## 2021-12-23 DIAGNOSIS — L98419 CHRONIC ULCER OF OTHER SPECIFIED SITES: ICD-10-CM

## 2021-12-23 PROBLEM — L98.499 NON-PRESSURE CHRONIC ULCER OF SKIN OF OTHER SITES WITH UNSPECIFIED SEVERITY: Status: ACTIVE | Noted: 2021-12-23

## 2021-12-23 PROCEDURE — OTHER SUTURE REMOVAL (GLOBAL PERIOD): OTHER

## 2021-12-23 PROCEDURE — OTHER COUNSELING: OTHER

## 2021-12-23 ASSESSMENT — LOCATION ZONE DERM
LOCATION ZONE: NOSE
LOCATION ZONE: EAR

## 2021-12-23 ASSESSMENT — LOCATION SIMPLE DESCRIPTION DERM
LOCATION SIMPLE: LEFT NOSE
LOCATION SIMPLE: RIGHT EAR

## 2021-12-23 ASSESSMENT — LOCATION DETAILED DESCRIPTION DERM
LOCATION DETAILED: LEFT NASAL ALA
LOCATION DETAILED: RIGHT CAVUM CONCHA

## 2021-12-23 NOTE — PROCEDURE: SUTURE REMOVAL (GLOBAL PERIOD)
Add 75482 Cpt? (Important Note: In 2017 The Use Of 67141 Is Being Tracked By Cms To Determine Future Global Period Reimbursement For Global Periods): no
Detail Level: Detailed

## 2021-12-23 NOTE — PROCEDURE: COUNSELING
Patient Specific Counseling (Will Not Stick From Patient To Patient): Explained that her donor site ulcer does not appear infected.  Recommended that she alternate Tylenol and ibuprofen every four hours on a regular schedule in order to maintain more effective pain control.
Detail Level: Detailed

## 2022-01-03 ENCOUNTER — RX ONLY (RX ONLY)
Age: 52
End: 2022-01-03

## 2022-01-03 RX ORDER — CIPROFLOXACIN 250 MG/1
TABLET, FILM COATED ORAL
Qty: 14 | Refills: 0 | Status: ERX | COMMUNITY
Start: 2022-01-03

## 2022-01-08 ENCOUNTER — HEALTH MAINTENANCE LETTER (OUTPATIENT)
Age: 52
End: 2022-01-08

## 2022-01-20 ENCOUNTER — APPOINTMENT (OUTPATIENT)
Dept: URBAN - METROPOLITAN AREA CLINIC 255 | Age: 52
Setting detail: DERMATOLOGY
End: 2022-01-24

## 2022-01-20 DIAGNOSIS — Z48.817 ENCOUNTER FOR SURGICAL AFTERCARE FOLLOWING SURGERY ON THE SKIN AND SUBCUTANEOUS TISSUE: ICD-10-CM

## 2022-01-20 DIAGNOSIS — L90.5 SCAR CONDITIONS AND FIBROSIS OF SKIN: ICD-10-CM

## 2022-01-20 DIAGNOSIS — L98429 CHRONIC ULCER OF OTHER SPECIFIED SITES: ICD-10-CM

## 2022-01-20 DIAGNOSIS — L98419 CHRONIC ULCER OF OTHER SPECIFIED SITES: ICD-10-CM

## 2022-01-20 PROBLEM — L98.499 NON-PRESSURE CHRONIC ULCER OF SKIN OF OTHER SITES WITH UNSPECIFIED SEVERITY: Status: ACTIVE | Noted: 2022-01-20

## 2022-01-20 PROCEDURE — OTHER COUNSELING: OTHER

## 2022-01-20 PROCEDURE — OTHER DEFER: OTHER

## 2022-01-20 PROCEDURE — OTHER MIPS QUALITY: OTHER

## 2022-01-20 PROCEDURE — 99212 OFFICE O/P EST SF 10 MIN: CPT | Mod: 24

## 2022-01-20 ASSESSMENT — LOCATION DETAILED DESCRIPTION DERM
LOCATION DETAILED: RIGHT INFERIOR FOREHEAD
LOCATION DETAILED: NASAL SUPRATIP
LOCATION DETAILED: LEFT FOREHEAD
LOCATION DETAILED: RIGHT CAVUM CONCHA

## 2022-01-20 ASSESSMENT — LOCATION ZONE DERM
LOCATION ZONE: EAR
LOCATION ZONE: NOSE
LOCATION ZONE: FACE

## 2022-01-20 ASSESSMENT — LOCATION SIMPLE DESCRIPTION DERM
LOCATION SIMPLE: RIGHT FOREHEAD
LOCATION SIMPLE: RIGHT EAR
LOCATION SIMPLE: NOSE
LOCATION SIMPLE: LEFT FOREHEAD

## 2022-01-20 NOTE — PROCEDURE: MIPS QUALITY
Quality 226: Preventive Care And Screening: Tobacco Use: Screening And Cessation Intervention: Patient screened for tobacco use and is an ex/non-smoker
Quality 431: Preventive Care And Screening: Unhealthy Alcohol Use - Screening: Patient not identified as an unhealthy alcohol user when screened for unhealthy alcohol use using a systematic screening method
Detail Level: Detailed
Quality 130: Documentation Of Current Medications In The Medical Record: Current Medications Documented
Quality 110: Preventive Care And Screening: Influenza Immunization: Influenza Immunization not Administered because Patient Refused.

## 2022-01-20 NOTE — PROCEDURE: DEFER
Introduction Text (Please End With A Colon): The following procedure was deferred:
Detail Level: Detailed
Procedure To Be Performed At Next Visit: Laser: Fraxel Re:pair
Other Procedure: laser

## 2022-01-20 NOTE — PROCEDURE: COUNSELING
Detail Level: Detailed
Patient Specific Counseling (Will Not Stick From Patient To Patient): Discussed Fraxel 1550 nm versus excision and reconstruction for this mature traumatic scar.  She will consider this for the future

## 2022-02-21 ENCOUNTER — APPOINTMENT (OUTPATIENT)
Dept: URBAN - METROPOLITAN AREA CLINIC 255 | Age: 52
Setting detail: DERMATOLOGY
End: 2022-02-23

## 2022-02-21 DIAGNOSIS — L90.5 SCAR CONDITIONS AND FIBROSIS OF SKIN: ICD-10-CM

## 2022-02-21 DIAGNOSIS — L57.8 OTHER SKIN CHANGES DUE TO CHRONIC EXPOSURE TO NONIONIZING RADIATION: ICD-10-CM

## 2022-02-21 PROCEDURE — OTHER MIPS QUALITY: OTHER

## 2022-02-21 PROCEDURE — OTHER CUTERA LASER: OTHER

## 2022-02-21 PROCEDURE — OTHER PATIENT SPECIFIC COUNSELING: OTHER

## 2022-02-21 PROCEDURE — 99024 POSTOP FOLLOW-UP VISIT: CPT

## 2022-02-21 PROCEDURE — OTHER COUNSELING: OTHER

## 2022-02-21 ASSESSMENT — LOCATION SIMPLE DESCRIPTION DERM
LOCATION SIMPLE: LEFT CHEEK
LOCATION SIMPLE: LEFT NOSE
LOCATION SIMPLE: RIGHT CHEEK

## 2022-02-21 ASSESSMENT — LOCATION DETAILED DESCRIPTION DERM
LOCATION DETAILED: RIGHT CENTRAL MALAR CHEEK
LOCATION DETAILED: LEFT CENTRAL MALAR CHEEK
LOCATION DETAILED: LEFT NASAL ALA

## 2022-02-21 ASSESSMENT — LOCATION ZONE DERM
LOCATION ZONE: NOSE
LOCATION ZONE: FACE

## 2022-02-21 NOTE — PROCEDURE: PATIENT SPECIFIC COUNSELING
Filler to superior lateral cheeks. 1 initial injection and follow up 1-3 months later. \\nDiscussed Fraxel 1550 vs Fraxel CO2 resurfacing treatment. \\nFraxel CO2 provides a more dramatic response, but also has a longer healing period post treatment. Would recommend full face. \\nFraxel 1550, could do specific areas of the face, healing period shorter than CO2
Detail Level: Zone

## 2022-02-21 NOTE — PROCEDURE: CUTERA LASER
Fluence In J/Cm2 (Will Not Render If Blank): 3.5
Wavelength (Optional): 1064 nm
Pre-Procedure: The treatment areas identified by the patient were cleansed and treatment was performed with the parameters mentioned above.
Pulse Width (Optional): 0.3 ms
Treatment Number: 1
Repetition Rate (Optional): 5 Hz
Number Of Pulses (Optional): 66
Consent: Written consent obtained, risks reviewed including but not limited to crusting, scabbing, blistering, scarring, darker or lighter pigmentary change, bruising, and/or incomplete response.
Handpiece (Optional): Eda
Number Of Passes (Will Not Render If Set To 0): 2
Detail Level: Detailed
Post-Procedure: Following the procedure the post care instructions were reviewed with the patient.
Post-Care Instructions: I reviewed with the patient in detail post-care instructions. Patient should stay away from the sun and wear sun protection until treated areas are fully healed.

## 2022-03-01 ENCOUNTER — APPOINTMENT (OUTPATIENT)
Dept: URBAN - METROPOLITAN AREA CLINIC 255 | Age: 52
Setting detail: DERMATOLOGY
End: 2022-03-07

## 2022-03-01 DIAGNOSIS — L90.5 SCAR CONDITIONS AND FIBROSIS OF SKIN: ICD-10-CM

## 2022-03-01 PROCEDURE — 99024 POSTOP FOLLOW-UP VISIT: CPT

## 2022-03-01 PROCEDURE — OTHER COUNSELING: OTHER

## 2022-03-01 PROCEDURE — OTHER MIPS QUALITY: OTHER

## 2022-03-01 ASSESSMENT — LOCATION SIMPLE DESCRIPTION DERM: LOCATION SIMPLE: LEFT NOSE

## 2022-03-01 ASSESSMENT — LOCATION DETAILED DESCRIPTION DERM: LOCATION DETAILED: LEFT NASAL ALA

## 2022-03-01 ASSESSMENT — LOCATION ZONE DERM: LOCATION ZONE: NOSE

## 2022-03-01 NOTE — PROCEDURE: COUNSELING
Detail Level: Detailed
Patient Specific Counseling (Will Not Stick From Patient To Patient): Post Eda laser. Patient okay to discontinue wound care.

## 2022-03-05 ENCOUNTER — HEALTH MAINTENANCE LETTER (OUTPATIENT)
Age: 52
End: 2022-03-05

## 2022-06-11 RX ORDER — SPIRONOLACTONE 25 MG/1
TABLET, FILM COATED ORAL BID
Qty: 60 | Refills: 0 | Status: ERX

## 2022-06-28 ENCOUNTER — APPOINTMENT (OUTPATIENT)
Dept: URBAN - METROPOLITAN AREA CLINIC 260 | Age: 52
Setting detail: DERMATOLOGY
End: 2022-06-29

## 2022-06-28 VITALS — HEIGHT: 68 IN | WEIGHT: 145 LBS

## 2022-06-28 DIAGNOSIS — Q828 OTHER SPECIFIED ANOMALIES OF SKIN: ICD-10-CM

## 2022-06-28 DIAGNOSIS — L57.8 OTHER SKIN CHANGES DUE TO CHRONIC EXPOSURE TO NONIONIZING RADIATION: ICD-10-CM

## 2022-06-28 DIAGNOSIS — Z85.828 PERSONAL HISTORY OF OTHER MALIGNANT NEOPLASM OF SKIN: ICD-10-CM

## 2022-06-28 DIAGNOSIS — Q826 OTHER SPECIFIED ANOMALIES OF SKIN: ICD-10-CM

## 2022-06-28 DIAGNOSIS — Q819 OTHER SPECIFIED ANOMALIES OF SKIN: ICD-10-CM

## 2022-06-28 DIAGNOSIS — L70.0 ACNE VULGARIS: ICD-10-CM

## 2022-06-28 DIAGNOSIS — L65.9 NONSCARRING HAIR LOSS, UNSPECIFIED: ICD-10-CM

## 2022-06-28 PROBLEM — L85.8 OTHER SPECIFIED EPIDERMAL THICKENING: Status: ACTIVE | Noted: 2022-06-28

## 2022-06-28 PROCEDURE — OTHER PRESCRIPTION: OTHER

## 2022-06-28 PROCEDURE — OTHER PRESCRIPTION MEDICATION MANAGEMENT: OTHER

## 2022-06-28 PROCEDURE — OTHER COUNSELING: OTHER

## 2022-06-28 PROCEDURE — 99214 OFFICE O/P EST MOD 30 MIN: CPT

## 2022-06-28 PROCEDURE — OTHER ADDITIONAL NOTES: OTHER

## 2022-06-28 RX ORDER — SPIRONOLACTONE 50 MG/1
TABLET, FILM COATED ORAL BID
Qty: 90 | Refills: 3 | Status: ERX | COMMUNITY
Start: 2022-06-28

## 2022-06-28 ASSESSMENT — LOCATION SIMPLE DESCRIPTION DERM
LOCATION SIMPLE: NOSE
LOCATION SIMPLE: RIGHT BUTTOCK
LOCATION SIMPLE: LEFT NOSE
LOCATION SIMPLE: LEFT BUTTOCK
LOCATION SIMPLE: LEFT FOREHEAD
LOCATION SIMPLE: ANTERIOR SCALP

## 2022-06-28 ASSESSMENT — LOCATION DETAILED DESCRIPTION DERM
LOCATION DETAILED: LEFT INFERIOR MEDIAL FOREHEAD
LOCATION DETAILED: NASAL DORSUM
LOCATION DETAILED: LEFT NASAL ALA
LOCATION DETAILED: LEFT BUTTOCK
LOCATION DETAILED: RIGHT BUTTOCK
LOCATION DETAILED: MID-FRONTAL SCALP

## 2022-06-28 ASSESSMENT — LOCATION ZONE DERM
LOCATION ZONE: NOSE
LOCATION ZONE: SCALP
LOCATION ZONE: TRUNK
LOCATION ZONE: FACE

## 2022-06-28 ASSESSMENT — SEVERITY ASSESSMENT OVERALL AMONG ALL PATIENTS
IN YOUR EXPERIENCE, AMONG ALL PATIENTS YOU HAVE SEEN WITH THIS CONDITION, HOW SEVERE IS THIS PATIENT'S CONDITION?: ALMOST CLEAR

## 2022-06-28 NOTE — PROCEDURE: PRESCRIPTION MEDICATION MANAGEMENT
Plan: Refills x one year
Render In Strict Bullet Format?: No
Continue Regimen: Spironolactone 50mg QD + LED therapy
Detail Level: Simple
Continue Regimen: Spironolactone

## 2022-06-28 NOTE — PROCEDURE: COUNSELING
Spironolactone Counseling: Patient advised regarding risks of diarrhea, abdominal pain, hyperkalemia, birth defects (for female patients), liver toxicity and renal toxicity. The patient may need blood work to monitor liver and kidney function and potassium levels while on therapy. The patient verbalized understanding of the proper use and possible adverse effects of spironolactone.  All of the patient's questions and concerns were addressed.
Winlevi Counseling:  I discussed with the patient the risks of topical clascoterone including but not limited to erythema, scaling, itching, and stinging. Patient voiced their understanding.
Birth Control Pills Pregnancy And Lactation Text: This medication should be avoided if pregnant and for the first 30 days post-partum.
Detail Level: Zone
Bactrim Counseling:  I discussed with the patient the risks of sulfa antibiotics including but not limited to GI upset, allergic reaction, drug rash, diarrhea, dizziness, photosensitivity, and yeast infections.  Rarely, more serious reactions can occur including but not limited to aplastic anemia, agranulocytosis, methemoglobinemia, blood dyscrasias, liver or kidney failure, lung infiltrates or desquamative/blistering drug rashes.
Topical Sulfur Applications Counseling: Topical Sulfur Counseling: Patient counseled that this medication may cause skin irritation or allergic reactions.  In the event of skin irritation, the patient was advised to reduce the amount of the drug applied or use it less frequently.   The patient verbalized understanding of the proper use and possible adverse effects of topical sulfur application.  All of the patient's questions and concerns were addressed.
Azelaic Acid Pregnancy And Lactation Text: This medication is considered safe during pregnancy and breast feeding.
Topical Clindamycin Counseling: Patient counseled that this medication may cause skin irritation or allergic reactions.  In the event of skin irritation, the patient was advised to reduce the amount of the drug applied or use it less frequently.   The patient verbalized understanding of the proper use and possible adverse effects of clindamycin.  All of the patient's questions and concerns were addressed.
Bactrim Pregnancy And Lactation Text: This medication is Pregnancy Category D and is known to cause fetal risk.  It is also excreted in breast milk.
Nicotinamide Supplementation Recommendations: 500mg bid
Dapsone Counseling: I discussed with the patient the risks of dapsone including but not limited to hemolytic anemia, agranulocytosis, rashes, methemoglobinemia, kidney failure, peripheral neuropathy, headaches, GI upset, and liver toxicity.  Patients who start dapsone require monitoring including baseline LFTs and weekly CBCs for the first month, then every month thereafter.  The patient verbalized understanding of the proper use and possible adverse effects of dapsone.  All of the patient's questions and concerns were addressed.
Azithromycin Counseling:  I discussed with the patient the risks of azithromycin including but not limited to GI upset, allergic reaction, drug rash, diarrhea, and yeast infections.
Aklief counseling:  Patient advised to apply a pea-sized amount only at bedtime and wait 30 minutes after washing their face before applying.  If too drying, patient may add a non-comedogenic moisturizer.  The most commonly reported side effects including irritation, redness, scaling, dryness, stinging, burning, itching, and increased risk of sunburn.  The patient verbalized understanding of the proper use and possible adverse effects of retinoids.  All of the patient's questions and concerns were addressed.
Azelaic Acid Counseling: Patient counseled that medicine may cause skin irritation and to avoid applying near the eyes.  In the event of skin irritation, the patient was advised to reduce the amount of the drug applied or use it less frequently.   The patient verbalized understanding of the proper use and possible adverse effects of azelaic acid.  All of the patient's questions and concerns were addressed.
Detail Level: Detailed
Sarecycline Pregnancy And Lactation Text: This medication is Pregnancy Category D and not consider safe during pregnancy. It is also excreted in breast milk.
Isotretinoin Pregnancy And Lactation Text: This medication is Pregnancy Category X and is considered extremely dangerous during pregnancy. It is unknown if it is excreted in breast milk.
Sarecycline Counseling: Patient advised regarding possible photosensitivity and discoloration of the teeth, skin, lips, tongue and gums.  Patient instructed to avoid sunlight, if possible.  When exposed to sunlight, patients should wear protective clothing, sunglasses, and sunscreen.  The patient was instructed to call the office immediately if the following severe adverse effects occur:  hearing changes, easy bruising/bleeding, severe headache, or vision changes.  The patient verbalized understanding of the proper use and possible adverse effects of sarecycline.  All of the patient's questions and concerns were addressed.
Erythromycin Counseling:  I discussed with the patient the risks of erythromycin including but not limited to GI upset, allergic reaction, drug rash, diarrhea, increase in liver enzymes, and yeast infections.
Minocycline Counseling: Patient advised regarding possible photosensitivity and discoloration of the teeth, skin, lips, tongue and gums.  Patient instructed to avoid sunlight, if possible.  When exposed to sunlight, patients should wear protective clothing, sunglasses, and sunscreen.  The patient was instructed to call the office immediately if the following severe adverse effects occur:  hearing changes, easy bruising/bleeding, severe headache, or vision changes.  The patient verbalized understanding of the proper use and possible adverse effects of minocycline.  All of the patient's questions and concerns were addressed.
Topical Clindamycin Pregnancy And Lactation Text: This medication is Pregnancy Category B and is considered safe during pregnancy. It is unknown if it is excreted in breast milk.
Doxycycline Counseling:  Patient counseled regarding possible photosensitivity and increased risk for sunburn.  Patient instructed to avoid sunlight, if possible.  When exposed to sunlight, patients should wear protective clothing, sunglasses, and sunscreen.  The patient was instructed to call the office immediately if the following severe adverse effects occur:  hearing changes, easy bruising/bleeding, severe headache, or vision changes.  The patient verbalized understanding of the proper use and possible adverse effects of doxycycline.  All of the patient's questions and concerns were addressed.
High Dose Vitamin A Counseling: Side effects reviewed, pt to contact office should one occur.
Erythromycin Pregnancy And Lactation Text: This medication is Pregnancy Category B and is considered safe during pregnancy. It is also excreted in breast milk.
Isotretinoin Counseling: Patient should get monthly blood tests, not donate blood, not drive at night if vision affected, not share medication, and not undergo elective surgery for 6 months after tx completed. Side effects reviewed, pt to contact office should one occur.
Benzoyl Peroxide Counseling: Patient counseled that medicine may cause skin irritation and bleach clothing.  In the event of skin irritation, the patient was advised to reduce the amount of the drug applied or use it less frequently.   The patient verbalized understanding of the proper use and possible adverse effects of benzoyl peroxide.  All of the patient's questions and concerns were addressed.
High Dose Vitamin A Pregnancy And Lactation Text: High dose vitamin A therapy is contraindicated during pregnancy and breast feeding.
Doxycycline Pregnancy And Lactation Text: This medication is Pregnancy Category D and not consider safe during pregnancy. It is also excreted in breast milk but is considered safe for shorter treatment courses.
Include Pregnancy/Lactation Warning?: No
Winlevi Pregnancy And Lactation Text: This medication is considered safe during pregnancy and breastfeeding.
Topical Retinoid counseling:  Patient advised to apply a pea-sized amount only at bedtime and wait 30 minutes after washing their face before applying.  If too drying, patient may add a non-comedogenic moisturizer. The patient verbalized understanding of the proper use and possible adverse effects of retinoids.  All of the patient's questions and concerns were addressed.
Benzoyl Peroxide Pregnancy And Lactation Text: This medication is Pregnancy Category C. It is unknown if benzoyl peroxide is excreted in breast milk.
Tazorac Pregnancy And Lactation Text: This medication is not safe during pregnancy. It is unknown if this medication is excreted in breast milk.
Tetracycline Counseling: Patient counseled regarding possible photosensitivity and increased risk for sunburn.  Patient instructed to avoid sunlight, if possible.  When exposed to sunlight, patients should wear protective clothing, sunglasses, and sunscreen.  The patient was instructed to call the office immediately if the following severe adverse effects occur:  hearing changes, easy bruising/bleeding, severe headache, or vision changes.  The patient verbalized understanding of the proper use and possible adverse effects of tetracycline.  All of the patient's questions and concerns were addressed. Patient understands to avoid pregnancy while on therapy due to potential birth defects.
Topical Retinoid Pregnancy And Lactation Text: This medication is Pregnancy Category C. It is unknown if this medication is excreted in breast milk.
Tazorac Counseling:  Patient advised that medication is irritating and drying.  Patient may need to apply sparingly and wash off after an hour before eventually leaving it on overnight.  The patient verbalized understanding of the proper use and possible adverse effects of tazorac.  All of the patient's questions and concerns were addressed.
Azithromycin Pregnancy And Lactation Text: This medication is considered safe during pregnancy and is also secreted in breast milk.
Aklief Pregnancy And Lactation Text: It is unknown if this medication is safe to use during pregnancy.  It is unknown if this medication is excreted in breast milk.  Breastfeeding women should use the topical cream on the smallest area of the skin for the shortest time needed while breastfeeding.  Do not apply to nipple and areola.
Spironolactone Pregnancy And Lactation Text: This medication can cause feminization of the male fetus and should be avoided during pregnancy. The active metabolite is also found in breast milk.
Birth Control Pills Counseling: Birth Control Pill Counseling: I discussed with the patient the potential side effects of OCPs including but not limited to increased risk of stroke, heart attack, thrombophlebitis, deep venous thrombosis, hepatic adenomas, breast changes, GI upset, headaches, and depression.  The patient verbalized understanding of the proper use and possible adverse effects of OCPs. All of the patient's questions and concerns were addressed.
Dapsone Pregnancy And Lactation Text: This medication is Pregnancy Category C and is not considered safe during pregnancy or breast feeding.
Topical Sulfur Applications Pregnancy And Lactation Text: This medication is Pregnancy Category C and has an unknown safety profile during pregnancy. It is unknown if this topical medication is excreted in breast milk.

## 2022-06-28 NOTE — PROCEDURE: ADDITIONAL NOTES
Additional Notes: Will treat with LN2 at full body skin exam PRN
Detail Level: Zone
Render Risk Assessment In Note?: no
Additional Notes: Schedule full body skin exam asap
Additional Notes: Recommend continuing ammonium lactate. Patient notified of over the counter access as her insurance does not cover it.

## 2022-11-01 ENCOUNTER — APPOINTMENT (OUTPATIENT)
Dept: URBAN - METROPOLITAN AREA CLINIC 260 | Age: 52
Setting detail: DERMATOLOGY
End: 2022-11-02

## 2022-11-01 VITALS — HEIGHT: 68 IN | WEIGHT: 135 LBS

## 2022-11-01 DIAGNOSIS — L81.4 OTHER MELANIN HYPERPIGMENTATION: ICD-10-CM

## 2022-11-01 DIAGNOSIS — D18.0 HEMANGIOMA: ICD-10-CM

## 2022-11-01 DIAGNOSIS — Z71.89 OTHER SPECIFIED COUNSELING: ICD-10-CM

## 2022-11-01 DIAGNOSIS — Z85.828 PERSONAL HISTORY OF OTHER MALIGNANT NEOPLASM OF SKIN: ICD-10-CM

## 2022-11-01 DIAGNOSIS — D22 MELANOCYTIC NEVI: ICD-10-CM

## 2022-11-01 DIAGNOSIS — L57.0 ACTINIC KERATOSIS: ICD-10-CM

## 2022-11-01 PROBLEM — D22.5 MELANOCYTIC NEVI OF TRUNK: Status: ACTIVE | Noted: 2022-11-01

## 2022-11-01 PROBLEM — D18.01 HEMANGIOMA OF SKIN AND SUBCUTANEOUS TISSUE: Status: ACTIVE | Noted: 2022-11-01

## 2022-11-01 PROCEDURE — OTHER MIPS QUALITY: OTHER

## 2022-11-01 PROCEDURE — OTHER LIQUID NITROGEN: OTHER

## 2022-11-01 PROCEDURE — 17000 DESTRUCT PREMALG LESION: CPT

## 2022-11-01 PROCEDURE — 99213 OFFICE O/P EST LOW 20 MIN: CPT | Mod: 25

## 2022-11-01 PROCEDURE — OTHER COUNSELING: OTHER

## 2022-11-01 ASSESSMENT — LOCATION DETAILED DESCRIPTION DERM
LOCATION DETAILED: NASAL ROOT
LOCATION DETAILED: LEFT NASAL ALA
LOCATION DETAILED: INFERIOR THORACIC SPINE

## 2022-11-01 ASSESSMENT — LOCATION ZONE DERM
LOCATION ZONE: TRUNK
LOCATION ZONE: NOSE

## 2022-11-01 ASSESSMENT — LOCATION SIMPLE DESCRIPTION DERM
LOCATION SIMPLE: UPPER BACK
LOCATION SIMPLE: LEFT NOSE
LOCATION SIMPLE: NOSE

## 2022-11-09 ENCOUNTER — OFFICE VISIT (OUTPATIENT)
Dept: FAMILY MEDICINE | Facility: CLINIC | Age: 52
End: 2022-11-09
Payer: COMMERCIAL

## 2022-11-09 VITALS
HEIGHT: 68 IN | HEART RATE: 65 BPM | DIASTOLIC BLOOD PRESSURE: 62 MMHG | BODY MASS INDEX: 20.46 KG/M2 | RESPIRATION RATE: 21 BRPM | OXYGEN SATURATION: 100 % | WEIGHT: 135 LBS | TEMPERATURE: 97.3 F | SYSTOLIC BLOOD PRESSURE: 98 MMHG

## 2022-11-09 DIAGNOSIS — Z13.220 SCREENING FOR HYPERLIPIDEMIA: ICD-10-CM

## 2022-11-09 DIAGNOSIS — M54.2 NECK PAIN: Primary | ICD-10-CM

## 2022-11-09 DIAGNOSIS — E83.52 SERUM CALCIUM ELEVATED: ICD-10-CM

## 2022-11-09 PROBLEM — K63.5 POLYP OF COLON: Status: ACTIVE | Noted: 2021-10-14

## 2022-11-09 PROBLEM — D12.2 BENIGN NEOPLASM OF ASCENDING COLON: Status: ACTIVE | Noted: 2021-10-18

## 2022-11-09 LAB
ANION GAP SERPL CALCULATED.3IONS-SCNC: 12 MMOL/L (ref 7–15)
BUN SERPL-MCNC: 16.2 MG/DL (ref 6–20)
CALCIUM SERPL-MCNC: 10.2 MG/DL (ref 8.6–10)
CHLORIDE SERPL-SCNC: 102 MMOL/L (ref 98–107)
CHOLEST SERPL-MCNC: 175 MG/DL
CREAT SERPL-MCNC: 0.72 MG/DL (ref 0.51–0.95)
CRP SERPL-MCNC: <3 MG/L
DEPRECATED HCO3 PLAS-SCNC: 24 MMOL/L (ref 22–29)
ERYTHROCYTE [DISTWIDTH] IN BLOOD BY AUTOMATED COUNT: 13.9 % (ref 10–15)
GFR SERPL CREATININE-BSD FRML MDRD: >90 ML/MIN/1.73M2
GLUCOSE SERPL-MCNC: 88 MG/DL (ref 70–99)
HCT VFR BLD AUTO: 40.4 % (ref 35–47)
HDLC SERPL-MCNC: 94 MG/DL
HGB BLD-MCNC: 13.4 G/DL (ref 11.7–15.7)
LDLC SERPL CALC-MCNC: 74 MG/DL
MCH RBC QN AUTO: 29.8 PG (ref 26.5–33)
MCHC RBC AUTO-ENTMCNC: 33.2 G/DL (ref 31.5–36.5)
MCV RBC AUTO: 90 FL (ref 78–100)
NONHDLC SERPL-MCNC: 81 MG/DL
PLATELET # BLD AUTO: 219 10E3/UL (ref 150–450)
POTASSIUM SERPL-SCNC: 3.9 MMOL/L (ref 3.4–5.3)
RBC # BLD AUTO: 4.49 10E6/UL (ref 3.8–5.2)
SODIUM SERPL-SCNC: 138 MMOL/L (ref 136–145)
T4 FREE SERPL-MCNC: 1.11 NG/DL (ref 0.9–1.7)
TRIGL SERPL-MCNC: 36 MG/DL
TSH SERPL DL<=0.005 MIU/L-ACNC: 1.43 UIU/ML (ref 0.3–4.2)
WBC # BLD AUTO: 6.5 10E3/UL (ref 4–11)

## 2022-11-09 PROCEDURE — 80061 LIPID PANEL: CPT | Performed by: FAMILY MEDICINE

## 2022-11-09 PROCEDURE — 90682 RIV4 VACC RECOMBINANT DNA IM: CPT | Performed by: FAMILY MEDICINE

## 2022-11-09 PROCEDURE — 36415 COLL VENOUS BLD VENIPUNCTURE: CPT | Performed by: FAMILY MEDICINE

## 2022-11-09 PROCEDURE — 80048 BASIC METABOLIC PNL TOTAL CA: CPT | Performed by: FAMILY MEDICINE

## 2022-11-09 PROCEDURE — 84443 ASSAY THYROID STIM HORMONE: CPT | Performed by: FAMILY MEDICINE

## 2022-11-09 PROCEDURE — 86140 C-REACTIVE PROTEIN: CPT | Performed by: FAMILY MEDICINE

## 2022-11-09 PROCEDURE — 90471 IMMUNIZATION ADMIN: CPT | Performed by: FAMILY MEDICINE

## 2022-11-09 PROCEDURE — 84439 ASSAY OF FREE THYROXINE: CPT | Performed by: FAMILY MEDICINE

## 2022-11-09 PROCEDURE — 99214 OFFICE O/P EST MOD 30 MIN: CPT | Mod: 25 | Performed by: FAMILY MEDICINE

## 2022-11-09 PROCEDURE — 85027 COMPLETE CBC AUTOMATED: CPT | Performed by: FAMILY MEDICINE

## 2022-11-09 ASSESSMENT — PATIENT HEALTH QUESTIONNAIRE - PHQ9
SUM OF ALL RESPONSES TO PHQ QUESTIONS 1-9: 1
10. IF YOU CHECKED OFF ANY PROBLEMS, HOW DIFFICULT HAVE THESE PROBLEMS MADE IT FOR YOU TO DO YOUR WORK, TAKE CARE OF THINGS AT HOME, OR GET ALONG WITH OTHER PEOPLE: NOT DIFFICULT AT ALL
SUM OF ALL RESPONSES TO PHQ QUESTIONS 1-9: 1

## 2022-11-09 NOTE — Clinical Note
Please abstract the following data from this visit with this patient into the appropriate field in Epic:  Tests that can be patient reported without a hard copy: Do you request the data if I submit this?  Mammogram done on this date: unkn (approximately), by this group: [facility], results were unkn.  and Pap smear done on this date: unkn (approximately), by this group: [facility], results were unkn.

## 2022-11-09 NOTE — PROGRESS NOTES
Assessment & Plan     Neck pain  An interesting and persistent lower neck pain.  Mild tenderness to palpation but no masses or anything to raise serious red flags at this time.  Thyroid tests normal.  Calcium somewhat elevated so we will further work-up as below.  Also thyroid ultrasound has been ordered.  CRP is negative so that is also somewhat reassuring.  Patient may want to consult with ENT if we come up empty on evaluation.  I will provide that referral now, and we can cancel it if we have a satisfactory explanation.    Serum calcium elevated  Working up with the following add on labs:  - Parathyroid Hormone Intact  - Vitamin D Deficiency    Screening for hyperlipidemia  Excellent lipid cholesterol profile.  - Lipid panel reflex to direct LDL Non-fasting    Return in about 1 week (around 2022) for Follow up.    Guerrero Mary MD  [facility]    Samuel Escobar is a 51 year old accompanied by her self, presenting for the following health issues:  Tender windpipe (X 5 months, patient stated that her sister  for stomach cancer. She has concerns about that and she would like to get it check.)      History of Present Illness       Reason for visit:  Soreness neck area throat area  Symptom onset:  More than a month  Symptoms include:  Tenderness  Symptom intensity:  Moderate  Symptom progression:  Staying the same  Had these symptoms before:  No    She eats 0-1 servings of fruits and vegetables daily.She consumes 0 sweetened beverage(s) daily.She exercises with enough effort to increase her heart rate 20 to 29 minutes per day.  She exercises with enough effort to increase her heart rate 5 days per week.   She is taking medications regularly.    Today's PHQ-9         PHQ-9 Total Score: 1    PHQ-9 Q9 Thoughts of better off dead/self-harm past 2 weeks :   Not at all    How difficult have these problems made it for you to do your work, take care of things at home, or get along  Sent to pharmacy   "with other people: Not difficult at all    Has noticed an on and off sensation in anterior neck for 3 to 5 months.  She notes it started during the summer.  She was very preoccupied as her sister was diagnosed with stomach cancer and then had a rapid progression until she  in September.  Has not really been swallow pain.  She feels a heaviness or tenderness anteriorly.  Wearing a marginally tight shirt collar pressed on it she noticed it.  Sometimes she was taking some larger magnesium pills that may have also been involved.  She has had no fever or chills.  No significant change in weight other than what she expected from change in eating patterns while coping with  her sister's illness.  No hot or cold intolerance.  No excessive urination or thirst.     Objective    BP 98/62 (BP Location: Right arm, Patient Position: Sitting, Cuff Size: Adult Regular)   Pulse 65   Temp 97.3  F (36.3  C) (Temporal)   Resp 21   Ht 1.728 m (5' 8.03\")   Wt 61.2 kg (135 lb)   SpO2 100%   BMI 20.51 kg/m    Body mass index is 20.51 kg/m .  Physical Exam   GENERAL: alert, not distressed  EYES: PERRL/EOMI, no scleral icterus, no conjunctival injection   EARS: normal tympanic membranes and external auditory canals bilaterally  PHARYNX: no erythema or exudates  MOUTH: well hydrated mucosa, no lesions  NECK: no lymphadenopathy or thyroid nodules   CHEST: clear, no rales, rhonchi, or wheezes  CARDIAC: regular without murmur, gallop, or rub      Results for orders placed or performed in visit on 22   Lipid panel reflex to direct LDL Non-fasting     Status: Normal   Result Value Ref Range    Cholesterol 175 <200 mg/dL    Triglycerides 36 <150 mg/dL    Direct Measure HDL 94 >=50 mg/dL    LDL Cholesterol Calculated 74 <=100 mg/dL    Non HDL Cholesterol 81 <130 mg/dL    Narrative    Cholesterol  Desirable:  <200 mg/dL    Triglycerides  Normal:  Less than 150 mg/dL  Borderline High:  150-199 mg/dL  High:  200-499 mg/dL  Very High:  " Greater than or equal to 500 mg/dL    Direct Measure HDL  Female:  Greater than or equal to 50 mg/dL   Male:  Greater than or equal to 40 mg/dL    LDL Cholesterol  Desirable:  <100mg/dL  Above Desirable:  100-129 mg/dL   Borderline High:  130-159 mg/dL   High:  160-189 mg/dL   Very High:  >= 190 mg/dL    Non HDL Cholesterol  Desirable:  130 mg/dL  Above Desirable:  130-159 mg/dL  Borderline High:  160-189 mg/dL  High:  190-219 mg/dL  Very High:  Greater than or equal to 220 mg/dL   CBC with platelets     Status: Normal   Result Value Ref Range    WBC Count 6.5 4.0 - 11.0 10e3/uL    RBC Count 4.49 3.80 - 5.20 10e6/uL    Hemoglobin 13.4 11.7 - 15.7 g/dL    Hematocrit 40.4 35.0 - 47.0 %    MCV 90 78 - 100 fL    MCH 29.8 26.5 - 33.0 pg    MCHC 33.2 31.5 - 36.5 g/dL    RDW 13.9 10.0 - 15.0 %    Platelet Count 219 150 - 450 10e3/uL   CRP, inflammation     Status: Normal   Result Value Ref Range    CRP Inflammation <3.00 <5.00 mg/L   Basic metabolic panel  (Ca, Cl, CO2, Creat, Gluc, K, Na, BUN)     Status: Abnormal   Result Value Ref Range    Sodium 138 136 - 145 mmol/L    Potassium 3.9 3.4 - 5.3 mmol/L    Chloride 102 98 - 107 mmol/L    Carbon Dioxide (CO2) 24 22 - 29 mmol/L    Anion Gap 12 7 - 15 mmol/L    Urea Nitrogen 16.2 6.0 - 20.0 mg/dL    Creatinine 0.72 0.51 - 0.95 mg/dL    Calcium 10.2 (H) 8.6 - 10.0 mg/dL    Glucose 88 70 - 99 mg/dL    GFR Estimate >90 >60 mL/min/1.73m2   TSH     Status: Normal   Result Value Ref Range    TSH 1.43 0.30 - 4.20 uIU/mL   T4, free     Status: Normal   Result Value Ref Range    Free T4 1.11 0.90 - 1.70 ng/dL       Total time exceeded 30 minutes including record review, examination room time, and documentation

## 2022-11-09 NOTE — Clinical Note
Please abstract the following data from this visit with this patient into the appropriate field in Epic:  Tests that can be patient reported without a hard copy:  Colonoscopy done on this date: 2021 (approximately), by this group: [facility], results were unknown

## 2022-11-11 ENCOUNTER — HOSPITAL ENCOUNTER (OUTPATIENT)
Dept: ULTRASOUND IMAGING | Facility: CLINIC | Age: 52
Discharge: HOME | End: 2022-11-11
Attending: FAMILY MEDICINE | Admitting: FAMILY MEDICINE
Payer: COMMERCIAL

## 2022-11-11 DIAGNOSIS — M54.2 NECK PAIN: ICD-10-CM

## 2022-11-11 PROCEDURE — 76536 US EXAM OF HEAD AND NECK: CPT

## 2022-11-15 ENCOUNTER — LAB (OUTPATIENT)
Dept: LAB | Facility: CLINIC | Age: 52
End: 2022-11-15
Payer: COMMERCIAL

## 2022-11-15 DIAGNOSIS — E83.52 SERUM CALCIUM ELEVATED: ICD-10-CM

## 2022-11-15 LAB
DEPRECATED CALCIDIOL+CALCIFEROL SERPL-MC: 99 UG/L (ref 20–75)
PTH-INTACT SERPL-MCNC: 32 PG/ML (ref 15–65)

## 2022-11-15 PROCEDURE — 36415 COLL VENOUS BLD VENIPUNCTURE: CPT | Performed by: FAMILY MEDICINE

## 2022-11-15 PROCEDURE — 82306 VITAMIN D 25 HYDROXY: CPT | Performed by: FAMILY MEDICINE

## 2022-11-15 PROCEDURE — 83970 ASSAY OF PARATHORMONE: CPT

## 2022-11-23 ENCOUNTER — OFFICE VISIT (OUTPATIENT)
Dept: OTOLARYNGOLOGY | Facility: CLINIC | Age: 52
End: 2022-11-23
Payer: COMMERCIAL

## 2022-11-23 VITALS
WEIGHT: 135 LBS | DIASTOLIC BLOOD PRESSURE: 78 MMHG | TEMPERATURE: 98.8 F | SYSTOLIC BLOOD PRESSURE: 124 MMHG | OXYGEN SATURATION: 98 % | HEIGHT: 68 IN | RESPIRATION RATE: 20 BRPM | HEART RATE: 77 BPM | BODY MASS INDEX: 20.46 KG/M2

## 2022-11-23 DIAGNOSIS — M54.2 NECK PAIN: ICD-10-CM

## 2022-11-23 PROCEDURE — 31575 DIAGNOSTIC LARYNGOSCOPY: CPT | Performed by: REGISTERED NURSE

## 2022-11-23 PROCEDURE — 99203 OFFICE O/P NEW LOW 30 MIN: CPT | Mod: 25 | Performed by: REGISTERED NURSE

## 2022-11-23 RX ORDER — ESTRADIOL 0.03 MG/D
FILM, EXTENDED RELEASE TRANSDERMAL
COMMUNITY
Start: 2022-03-25 | End: 2023-09-25

## 2022-11-23 RX ORDER — SPIRONOLACTONE 50 MG/1
TABLET, FILM COATED ORAL
COMMUNITY
Start: 2022-09-30 | End: 2024-03-26

## 2022-11-23 RX ORDER — PROGESTERONE 100 MG/1
CAPSULE ORAL
COMMUNITY
Start: 2022-11-12 | End: 2023-09-25

## 2022-11-23 ASSESSMENT — PAIN SCALES - GENERAL: PAINLEVEL: NO PAIN (0)

## 2022-11-23 NOTE — PROGRESS NOTES
[facility]   Head and Neck Surgery  November 23, 2022    Referring Provider: Guerrero Mary MD    HPI: Luz Sharpe is a 51 year old female who presents today for evaluation of anterior neck pain. Symptoms started about 5 months ago. States that pain started after wearing a tight collared shirt. Pain is intermittent and most present with palpation of anterior neck.  Patient denies any dysphagia, odynophagia, ear pain, hemoptysis, shortness of breath, voice changes or lumps/bumps of the neck.    Patient does report that she has been under a great deal of stress. Sister recently passed away from stomach cancer.     Past Medical History:  No past medical history on file.    Past Surgical History:  No past surgical history on file.    Medications:  Current Outpatient Medications   Medication Sig Dispense Refill     APRI 0.15-30 MG-MCG tablet Take 1 tablet by mouth daily       EPINEPHrine (ANY BX GENERIC EQUIV) 0.3 MG/0.3ML injection 2-pack USE AS DIRECTED FOR 1 DOSE. MAY REPEAT IF NEEDED       valACYclovir (VALTREX) 500 MG tablet Take 500 mg by mouth daily       Allergies:  Allergies   Allergen Reactions     Fish Swelling     Fish Oil Unknown      Social History:  Social History     Tobacco Use     Smoking status: Never     Smokeless tobacco: Never     ROS: 10 point ROS neg other than the symptoms noted above in the HPI.    Physical Exam:    There were no vitals taken for this visit.     Constitutional:  The patient was unaccompanied, well-groomed, and in no acute distress.     Neurologic: Alert and oriented x 3.  CN's III-XII within normal limits.  Voice normal.   Psychiatric: The patient's affect was calm, cooperative, and appropriate.     Communication:  Normal; communicates verbally, normal voice quality.    Respiratory: Breathing comfortably without stridor or exertion of accessory muscles.    Head/Face:  Normocephalic and atraumatic.  No lesions or scars.   Salivary glands -  Normal  size, no tenderness, swelling, or palpable masses    Ears: Pinnae and tragus non-tender.  EAC's and TM's were clear.     Oral Cavity: Normal tongue, floor of mouth, buccal mucosa, and palate.  No lesions or masses on inspection.    Oropharynx: Normal mucosa, palate symmetric with normal elevation. No abnormal lymph tissue in the oropharynx.     Neck: Supple with normal laryngeal and tracheal landmarks.  The parotid beds were without masses.  No palpable thyroid.  Normal range of motion.    Lymphatic: There is no palpable lymphadenopathy in the neck.     Flexible fiberoptic laryngoscopy: Scope exam was indicated due to neck pain. Verbal consent was obtained. The nasal cavity was prepped with an aerosolized solution of topical anesthetic and vasoconstrictive agent. The scope was passed through the anterior nasal cavity and advanced. Inspection of the nasopharynx revealed no gross abnormality. The base of tongue and vallecula are normal. The epiglottis, AE folds, false cords, true cords, arytenoids are normal.  Inspection of the larynx revealed bilaterally mobile vocal cords. Pyriform sinuses are symmetric. The airway is patent. Procedure tolerated well with no immediate complications noted.    Labs and Imaging Reviewed:  Imaging:   US Thyroid  11/11/22  IMPRESSION:  1.  There are 2 small nodules in the right lobe. They do not meet criteria for biopsy at this time the more superior nodule is TI-RADS 4 lesion less than 1 cm in diameter. Because it is quite hypoechoic a follow-up study in one year is recommended.    Labs:  TSH   Date Value Ref Range Status   11/09/2022 1.43 0.30 - 4.20 uIU/mL Final     Assessment/Plan:  1. Neck pain  Patient with 5 month history of anterior neck pain. Physical and scope exam unremarkable today. Suspect that these symptoms are related to muscle tension. Patient, understandably, has been under a lot of stress which can create a large amount of tension. Given family history of cancer, will  obtain a CT neck.    If CT is normal, recommend that patient treat muscle tension. Patient may benefit from PT referral for neck tension.     Brittany Hagan DNP, APRN, CNP  Otolaryngology  Head & Neck Surgery  [phone]    30 minutes spent on the date of the encounter doing chart review, history and exam, documentation and further activities per the note excluding time performing scope exam.

## 2022-11-23 NOTE — LETTER
11/23/2022       RE: Luz Sharpe  [address]  [facility] [id number]     Dear Colleague,    Thank you for referring your patient, Luz Sharpe, to the [facility] EAR NOSE AND THROAT CLINIC [place] [facility]. Please see a copy of my visit note below.    [facility] Ear, Nose and Throat [facility]   Head and Neck Surgery  November 23, 2022    Referring Provider: Guerrero Mary MD    HPI: Luz Sharpe is a 51 year old female who presents today for evaluation of anterior neck pain. Symptoms started about 5 months ago. States that pain started after wearing a tight collared shirt. Pain is intermittent and most present with palpation of anterior neck.  Patient denies any dysphagia, odynophagia, ear pain, hemoptysis, shortness of breath, voice changes or lumps/bumps of the neck.    Patient does report that she has been under a great deal of stress. Sister recently passed away from stomach cancer.     Past Medical History:  No past medical history on file.    Past Surgical History:  No past surgical history on file.    Medications:  Current Outpatient Medications   Medication Sig Dispense Refill     APRI 0.15-30 MG-MCG tablet Take 1 tablet by mouth daily       EPINEPHrine (ANY BX GENERIC EQUIV) 0.3 MG/0.3ML injection 2-pack USE AS DIRECTED FOR 1 DOSE. MAY REPEAT IF NEEDED       valACYclovir (VALTREX) 500 MG tablet Take 500 mg by mouth daily       Allergies:  Allergies   Allergen Reactions     Fish Swelling     Fish Oil Unknown      Social History:  Social History     Tobacco Use     Smoking status: Never     Smokeless tobacco: Never     ROS: 10 point ROS neg other than the symptoms noted above in the HPI.    Physical Exam:    There were no vitals taken for this visit.     Constitutional:  The patient was unaccompanied, well-groomed, and in no acute distress.     Neurologic: Alert and oriented x 3.  CN's III-XII within normal limits.  Voice  normal.   Psychiatric: The patient's affect was calm, cooperative, and appropriate.     Communication:  Normal; communicates verbally, normal voice quality.    Respiratory: Breathing comfortably without stridor or exertion of accessory muscles.    Head/Face:  Normocephalic and atraumatic.  No lesions or scars.   Salivary glands -  Normal size, no tenderness, swelling, or palpable masses    Ears: Pinnae and tragus non-tender.  EAC's and TM's were clear.     Oral Cavity: Normal tongue, floor of mouth, buccal mucosa, and palate.  No lesions or masses on inspection.    Oropharynx: Normal mucosa, palate symmetric with normal elevation. No abnormal lymph tissue in the oropharynx.     Neck: Supple with normal laryngeal and tracheal landmarks.  The parotid beds were without masses.  No palpable thyroid.  Normal range of motion.    Lymphatic: There is no palpable lymphadenopathy in the neck.     Flexible fiberoptic laryngoscopy: Scope exam was indicated due to neck pain. Verbal consent was obtained. The nasal cavity was prepped with an aerosolized solution of topical anesthetic and vasoconstrictive agent. The scope was passed through the anterior nasal cavity and advanced. Inspection of the nasopharynx revealed no gross abnormality. The base of tongue and vallecula are normal. The epiglottis, AE folds, false cords, true cords, arytenoids are normal.  Inspection of the larynx revealed bilaterally mobile vocal cords. Pyriform sinuses are symmetric. The airway is patent. Procedure tolerated well with no immediate complications noted.    Labs and Imaging Reviewed:  Imaging:   US Thyroid  11/11/22  IMPRESSION:  1.  There are 2 small nodules in the right lobe. They do not meet criteria for biopsy at this time the more superior nodule is TI-RADS 4 lesion less than 1 cm in diameter. Because it is quite hypoechoic a follow-up study in one year is recommended.    Labs:  TSH   Date Value Ref Range Status   11/09/2022 1.43 0.30 - 4.20  uIU/mL Final     Assessment/Plan:  1. Neck pain  Patient with 5 month history of anterior neck pain. Physical and scope exam unremarkable today. Suspect that these symptoms are related to muscle tension. Patient, understandably, has been under a lot of stress which can create a large amount of tension. Given family history of cancer, will obtain a CT neck.    If CT is normal, recommend that patient treat muscle tension. Patient may benefit from PT referral for neck tension.     Brittany Hagan DNP, APRN, CNP  Otolaryngology  Head & Neck Surgery  [phone]    30 minutes spent on the date of the encounter doing chart review, history and exam, documentation and further activities per the note excluding time performing scope exam.        Again, thank you for allowing me to participate in the care of your patient.      Sincerely,    Kim Hagan, NP

## 2022-11-23 NOTE — PATIENT INSTRUCTIONS
- You were seen in the ENT Clinic today by Brittany Hagan NP.   - Schedule CT scan at your convenience.   - Please send a [employer] message or call the ENT clinic at [phone] with any questions or concerns.

## 2022-12-04 ENCOUNTER — HOSPITAL ENCOUNTER (OUTPATIENT)
Dept: CT IMAGING | Facility: CLINIC | Age: 52
Discharge: HOME | End: 2022-12-04
Attending: REGISTERED NURSE | Admitting: REGISTERED NURSE
Payer: COMMERCIAL

## 2022-12-04 DIAGNOSIS — M54.2 NECK PAIN: ICD-10-CM

## 2022-12-04 PROCEDURE — 70491 CT SOFT TISSUE NECK W/DYE: CPT

## 2022-12-04 PROCEDURE — 250N000011 HC RX IP 250 OP 636: Performed by: REGISTERED NURSE

## 2022-12-04 RX ORDER — IOPAMIDOL 755 MG/ML
90 INJECTION, SOLUTION INTRAVASCULAR ONCE
Status: COMPLETED | OUTPATIENT
Start: 2022-12-04 | End: 2022-12-04

## 2022-12-04 RX ADMIN — IOPAMIDOL 90 ML: 755 INJECTION, SOLUTION INTRAVENOUS at 11:04

## 2023-04-15 ENCOUNTER — HEALTH MAINTENANCE LETTER (OUTPATIENT)
Age: 53
End: 2023-04-15

## 2023-04-20 ENCOUNTER — TRANSFERRED RECORDS (OUTPATIENT)
Dept: MULTI SPECIALTY CLINIC | Facility: CLINIC | Age: 53
End: 2023-04-20

## 2023-04-20 LAB — PAP SMEAR - HIM PATIENT REPORTED: NORMAL

## 2023-07-07 ENCOUNTER — RX ONLY (RX ONLY)
Age: 53
End: 2023-07-07

## 2023-07-07 RX ORDER — SPIRONOLACTONE 50 MG/1
TABLET, FILM COATED ORAL
Qty: 90 | Refills: 1 | Status: CANCELLED
Stop reason: CLARIF

## 2023-09-25 ENCOUNTER — OFFICE VISIT (OUTPATIENT)
Dept: FAMILY MEDICINE | Facility: CLINIC | Age: 53
End: 2023-09-25
Payer: COMMERCIAL

## 2023-09-25 VITALS
TEMPERATURE: 98.5 F | DIASTOLIC BLOOD PRESSURE: 70 MMHG | OXYGEN SATURATION: 97 % | RESPIRATION RATE: 14 BRPM | HEIGHT: 68 IN | SYSTOLIC BLOOD PRESSURE: 106 MMHG | BODY MASS INDEX: 18.79 KG/M2 | HEART RATE: 78 BPM | WEIGHT: 124 LBS

## 2023-09-25 DIAGNOSIS — Z91.013 SEAFOOD ALLERGY: ICD-10-CM

## 2023-09-25 DIAGNOSIS — R59.1 LYMPHADENOPATHY: Primary | ICD-10-CM

## 2023-09-25 LAB
BASOPHILS # BLD AUTO: 0 10E3/UL (ref 0–0.2)
BASOPHILS NFR BLD AUTO: 0 %
EOSINOPHIL # BLD AUTO: 0 10E3/UL (ref 0–0.7)
EOSINOPHIL NFR BLD AUTO: 1 %
ERYTHROCYTE [DISTWIDTH] IN BLOOD BY AUTOMATED COUNT: 13 % (ref 10–15)
HCT VFR BLD AUTO: 44 % (ref 35–47)
HGB BLD-MCNC: 14.7 G/DL (ref 11.7–15.7)
IMM GRANULOCYTES # BLD: 0 10E3/UL
IMM GRANULOCYTES NFR BLD: 0 %
LYMPHOCYTES # BLD AUTO: 1.5 10E3/UL (ref 0.8–5.3)
LYMPHOCYTES NFR BLD AUTO: 25 %
MCH RBC QN AUTO: 29 PG (ref 26.5–33)
MCHC RBC AUTO-ENTMCNC: 33.4 G/DL (ref 31.5–36.5)
MCV RBC AUTO: 87 FL (ref 78–100)
MONOCYTES # BLD AUTO: 0.4 10E3/UL (ref 0–1.3)
MONOCYTES NFR BLD AUTO: 7 %
NEUTROPHILS # BLD AUTO: 4.1 10E3/UL (ref 1.6–8.3)
NEUTROPHILS NFR BLD AUTO: 67 %
PLATELET # BLD AUTO: 240 10E3/UL (ref 150–450)
RBC # BLD AUTO: 5.07 10E6/UL (ref 3.8–5.2)
WBC # BLD AUTO: 6 10E3/UL (ref 4–11)

## 2023-09-25 PROCEDURE — 85025 COMPLETE CBC W/AUTO DIFF WBC: CPT | Performed by: PHYSICIAN ASSISTANT

## 2023-09-25 PROCEDURE — 99214 OFFICE O/P EST MOD 30 MIN: CPT | Performed by: PHYSICIAN ASSISTANT

## 2023-09-25 PROCEDURE — 36415 COLL VENOUS BLD VENIPUNCTURE: CPT | Performed by: PHYSICIAN ASSISTANT

## 2023-09-25 PROCEDURE — 86003 ALLG SPEC IGE CRUDE XTRC EA: CPT | Performed by: PHYSICIAN ASSISTANT

## 2023-09-25 RX ORDER — TRAZODONE HYDROCHLORIDE 100 MG/1
100 TABLET ORAL AT BEDTIME
COMMUNITY
Start: 2023-08-24 | End: 2024-04-22

## 2023-09-25 RX ORDER — CITALOPRAM HYDROBROMIDE 10 MG/1
10 TABLET ORAL DAILY
COMMUNITY
Start: 2023-08-24 | End: 2024-04-22

## 2023-09-25 ASSESSMENT — ENCOUNTER SYMPTOMS
NERVOUS/ANXIOUS: 1
FATIGUE: 0
DIZZINESS: 0
NUMBNESS: 0
PALPITATIONS: 0
FEVER: 0
COUGH: 0
LIGHT-HEADEDNESS: 0
CHILLS: 0
SHORTNESS OF BREATH: 0
WHEEZING: 0

## 2023-09-25 NOTE — PROGRESS NOTES
Assessment & Plan   Lymphadenopathy  Small right cervical enlarged lymph nodes.  Nontender.  Nonfixed.  Could be due to increased stress.  She is not currently having any upper respiratory infection symptoms.  Since his symptoms have been ongoing for 3 months we will get a ultrasound of the lymph nodes.  We will also empirically start her on Augmentin.  We will also check a CBC with platelet and differential.  She is to monitor things in the meantime.  If there is no improvement despite antibiotics she is to let me know.  - US Head Neck Soft Tissue; Future  - amoxicillin-clavulanate (AUGMENTIN) 875-125 MG tablet; Take 1 tablet by mouth 2 times daily for 10 days  - CBC with platelets and differential; Future  - CBC with platelets and differential    Seafood allergy  Tree of shellfish allergy.  She would like to do testing to see if she is still allergic to shellfish or not.  - Allergy adult food panel; Future  - Allergy adult food panel      [facility]    Samuel Escobar is a 52 year old, presenting for the following health issues:  Mass (Pt reports she has noticed a lump in her neck R side. Possible swollen lymph node.)        9/25/2023     1:49 PM   Additional Questions   Roomed by Hyacinth Minor   Accompanied by none       Luz is a pleasant 52-year-old female who presents to the clinic today for evaluation on a lump to the left side of her neck.  She states that she noticed it about 3 months ago.  The lump has not gotten larger or smaller over the last 3 months.  She was not having any upper respiratory symptoms when she noticed the lump.  She has noted some weight loss and insomnia over the last few months but she has been under a lot of stress and recently was started on antidepressants about a month ago.  She has not been having any night sweats.  No unintentional weight loss.  No fevers.    She states that she was tested for food allergy in the past  "and testing came up that she was allergic to shellfish.  She would like to recheck labs today.    History of Present Illness       Reason for visit:  Lump on neck for 3 months.  Enlarged veins in arms.  Symptom onset:  More than a month  Symptoms include:  See above  Symptom intensity:  Mild  Symptom progression:  Staying the same  Had these symptoms before:  Yes  Has tried/received treatment for these symptoms:  No  What makes it worse:  Similar that I had pain in the neck windpipe area. Had a CT scan but nothing was found.    She eats 2-3 servings of fruits and vegetables daily.She consumes 1 sweetened beverage(s) daily.She exercises with enough effort to increase her heart rate 30 to 60 minutes per day.  She exercises with enough effort to increase her heart rate 5 days per week.   She is taking medications regularly.       Review of Systems   Constitutional:  Negative for chills, fatigue and fever.   Respiratory:  Negative for cough, shortness of breath and wheezing.    Cardiovascular:  Negative for chest pain and palpitations.   Skin:         Lump to left side of neck   Neurological:  Negative for dizziness, light-headedness and numbness.   Psychiatric/Behavioral:  Positive for mood changes. The patient is nervous/anxious.             Objective    /70 (BP Location: Left arm, Patient Position: Sitting, Cuff Size: Adult Regular)   Pulse 78   Temp 98.5  F (36.9  C) (Oral)   Resp 14   Ht 1.727 m (5' 8\")   Wt 56.2 kg (124 lb)   SpO2 97%   BMI 18.85 kg/m    Body mass index is 18.85 kg/m .  Physical Exam  Vitals and nursing note reviewed.   Constitutional:       Appearance: Normal appearance.   HENT:      Head: Atraumatic.   Eyes:      Conjunctiva/sclera: Conjunctivae normal.   Neck:      Comments: 2 small left cervical lymph adenopathy.  Nontender.  Nonfixed.  No evidence of infection.  Cardiovascular:      Rate and Rhythm: Normal rate and regular rhythm.      Heart sounds: No murmur heard.     No " friction rub. No gallop.   Pulmonary:      Effort: Pulmonary effort is normal.      Breath sounds: No wheezing, rhonchi or rales.   Lymphadenopathy:      Cervical: Cervical adenopathy present.      Right cervical: Superficial cervical adenopathy present. No deep or posterior cervical adenopathy.     Left cervical: No superficial, deep or posterior cervical adenopathy.   Skin:     General: Skin is warm and dry.   Neurological:      General: No focal deficit present.      Mental Status: She is alert and oriented to person, place, and time.      Motor: No weakness.   Psychiatric:         Mood and Affect: Mood normal.         Behavior: Behavior normal.         Thought Content: Thought content normal.         Judgment: Judgment normal.

## 2023-09-26 ENCOUNTER — MYC MEDICAL ADVICE (OUTPATIENT)
Dept: FAMILY MEDICINE | Facility: CLINIC | Age: 53
End: 2023-09-26

## 2023-09-26 DIAGNOSIS — R59.1 LYMPHADENOPATHY: Primary | ICD-10-CM

## 2023-09-26 DIAGNOSIS — E04.1 THYROID NODULE: ICD-10-CM

## 2023-09-27 LAB
ALMOND IGE QN: <0.1 KU(A)/L
CASHEW NUT IGE QN: <0.1 KU(A)/L
CODFISH IGE QN: 2.65 KU(A)/L
COW MILK IGE QN: <0.1 KU(A)/L
EGG WHITE IGE QN: <0.1 KU(A)/L
HAZELNUT IGE QN: <0.1 KU(A)/L
IGE SERPL-ACNC: 11 KU/L (ref 0–114)
PEANUT IGE QN: <0.1 KU(A)/L
SALMON IGE QN: 3.02 KU(A)/L
SCALLOP IGE QN: <0.1 KU(A)/L
SESAME SEED IGE QN: <0.1 KU(A)/L
SHRIMP IGE QN: <0.1 KU(A)/L
SOYBEAN IGE QN: <0.1 KU(A)/L
TUNA IGE QN: 1.41 KU(A)/L
WALNUT IGE QN: <0.1 KU(A)/L
WHEAT IGE QN: <0.1 KU(A)/L

## 2023-09-29 RX ORDER — CEFDINIR 300 MG/1
300 CAPSULE ORAL 2 TIMES DAILY
Qty: 20 CAPSULE | Refills: 0 | Status: SHIPPED | OUTPATIENT
Start: 2023-09-29 | End: 2023-10-09

## 2023-10-01 ENCOUNTER — HOSPITAL ENCOUNTER (OUTPATIENT)
Dept: ULTRASOUND IMAGING | Facility: HOSPITAL | Age: 53
Discharge: HOME | End: 2023-10-01
Attending: PHYSICIAN ASSISTANT | Admitting: PHYSICIAN ASSISTANT
Payer: COMMERCIAL

## 2023-10-01 DIAGNOSIS — R59.1 LYMPHADENOPATHY: ICD-10-CM

## 2023-10-01 PROCEDURE — 76536 US EXAM OF HEAD AND NECK: CPT

## 2023-10-09 NOTE — TELEPHONE ENCOUNTER
LM informing that Leonardo is out for the week and to call back with urgent questions or concerns.    Coral Oliver RN  [facility]

## 2023-10-11 NOTE — TELEPHONE ENCOUNTER
FUTURE VISIT INFORMATION:      FUTURE VISIT INFORMATION:  Date: 10/13/23  Time: 4:30 pm  Location: [facility] [place]  REFERRAL INFORMATION:  Referring provider: Regi Winters CNP   [facility]   Reason for visit/diagnosis:  Lymphadenopathy/Thyroid nodule- Referred by Regi Winters CNP in [facility]     RECORDS REQUESTED FROM:       Clinic name Comments Records Status Imaging Status   [facility]  9/26/23 OV note- Regi Winters CNP  [facility] Imaging US head neck 10/1/23  CT neck 12/4/22  US Thyroid  11/11/22 [facility] Nose and Thro[facility] [place] 11/23/22 note- Kim Hagan NP   Epic       Patient: NIALL HAYES     Acct: [id number]     Report: #[id number]  UNIT #: [id number]     : 1956    Encounter Date:2020  PRIMARY CARE: Rex Hayden  ***Signed***  --------------------------------------------------------------------------------------------------------------------  NURSE INTAKE      Visit Type      Established Patient Visit            Chief Complaint      bladder ca      Intent of Therapy:  Curative            Referring Provider/Copies To      Referring Provider:  Bartolo Webber      Primary Care Provider:  Rex Hayden      Copies To:   Rex Hayden            History and Present Illness      Past Oncology Illness History      Mr. Hayes is a pleasant 65yo WM with hx hematuria. Cystoscopy revealed mass in     the bladder. 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            HPI - Oncology Interim      Patient returns for follow-up of his bladder cancer.  He states that he is doing    well.  He denies any issues from his neobladder.  Since his last visit, he has     had his port removed.  He reports good energy level.  He denies any masses     lymphadenopathy.  He is eating drinking well, his weight is maintained.            Cancer Details            Bladder, urothelial carcinoma      Prostate cancer, adenocarcinoma, 4+3 Eb score            Clinical Staging      Stage II ([id number])            Treatments      Chemotherapy      10/10/18 began DD MVAC      Surgeries      Left upper lobectomy .  Radical cystoprostatectomy with neobladder .      PORT REMOVED            Clinical Trial Participant      No            ECOG Performance Status      0            Most Recent Lab Findings      Laboratory Tests      5/15/20 09:58            Most Recent Imaging Findings            Patient: NIALL HAYES   Acct: #[id number]   Report: #[id number]            UNIT #: [id number]    DOS: 05/15/20 0945      INSURANCE:BLUE ACCESS NETWORK - [facility]   ORDER #:[id number]       LOCATION:CT     : 1956            PROVIDERS      ADMITTING:     ATTENDING: ANNAMARIA HERNANDEZ      FAMILY:  JackelynRex   ORDERING:  ANNAMARIA HERNANDEZ         OTHER:    DICTATING:  Jo Trevizo MD            REQ #:[id number]   EXAM:[facility] - CT ABD CHEST PEL w CONTR      REASON FOR EXAM:  BLADDER CA      REASON FOR VISIT:  BLADDER CA            *******Signed******         PROCEDURE:   CT ABDOMEN; CT CHEST; CT PELVIS WITH CONTRAST             COMPARISON:   [facility], CT, CT CHEST ABD PEL W CONTRAST,     10/28/2019, 15:26.             INDICATIONS:   BLADDER CA             TECHNIQUE:   After obtaining the patient's consent, CT images were obtained with    intravenous contrast       material.             FINDINGS:         Chest:  The lungs are clear.  No focal consolidations identified.  Posttreatment    changes are seen       within the medial aspect of the left lung extending along the perihilar region     which appears       unchanged as compared to the previous study with volume loss and scarring     present.  Emphysematous       changes are present.  No focal pulmonary nodule identified.  No pleural     effusion.  No focal       airspace consolidations identified.  Atherosclerotic calcifications are present.     Granulomatous       calcifications are present.  No suspicious adenopathy identified.  The     vasculature otherwise       appears grossly unremarkable.  No acute osseous abnormality identified.  The     thyroid gland appears       unremarkable.  No axillary adenopathy identified.             Abdomen and pelvis:             The liver, pancreas and spleen demonstrate no acute process.  The gallbladder     appears normal in       caliber.  Stones are present within the gallbladder lumen.  No inflammatory     changes identified.  No       evidence of biliary dilatation. The adrenal glands appear unremarkable.  The     kidneys appear normal       in size.  No evidence of nephrolithiasis.  No  evidence of hydronephrosis.  No     focal lesions       identified.             No dilated  loops of bowel identified.  No evidence of obstruction.  No free     air.  There is       diverticulosis of the sigmoid colon extending to the descending colon.  No     significant inflammatory       changes identified.  Stable scarring is present likely related to previous     inflammatory process.        Significant atherosclerotic calcifications are present.  No mesenteric fluid     collections       identified.  The appendix appears unremarkable.  No suspicious adenopathy     identified.  No       significant free fluid.  Probable postsurgical changes are seen related to     cystectomy with       neobladder creation.  This appears similar as compared to the previous study.      No focal mass or       adenopathy identified.  No significant free fluid identified.  The pelvic organs    demonstrate no       acute process.  No acute osseous abnormality is identified.             CONCLUSION:         1. No evidence of metastatic disease within the chest, abdomen or pelvis.  No     acute process       identified..      2. Ancillary findings as described above.              MASSIMO BRYAN MD             Electronically Signed and Approved By: MASSIMO BRYAN MD on 5/15/2020 at 11:55                        Until signed, this is an unconfirmed preliminary report that may contain      errors and is subject to change.                                              KOGIL:      D:05/15/20 1155            PAST, FAMILY   Past Medical History      Past Medical History:  Arthritis, COPD, Hypertension, Kidney Disease (kidney     failure)      Hematology/Oncology (M):  Bladder Cancer, Lung Cancer            Past Surgical History      Biopsy (lung left), Bladder Surgery (BLADDER REMOVED), Fracture Repair, Lung     Biopsy, VAD Placement (and removal)            Lobectomy 2009            Family History      Family History:  Lung Cancer (father and 5  paternal uncles), Prostate Cancer     (brother)            Social History      Marital Status:        Lives independently:  Yes      Number of Children:  3      Occupation:  [employer]            Tobacco Use      Tobacco status:  Former smoker (1 ppd for 40 years quit 2008)      Quit status:  Quit date established (2008)            Alcohol Use      Alcohol intake:  None            Substance Use      Substance use:  Denies use            REVIEW OF SYSTEMS      General:  Admits: Fatigue, Weight Gain      ENT:  Denies Headache      Cardiovascular:  Denies Chest Pain      Respiratory:  Denies: Cough      Gastrointestinal:  Denies Constipation, Denies Diarrhea      Musculoskeletal:  Denies Muscle Pain      Neurologic:  Denies Dizziness, Denies Numbness\Tingling            VITAL SIGNS AND SCORES      Vitals      Weight 216 lbs 14.922 oz / 98.4 kg      Temperature 97.8 F / 36.56 C - Temporal      Pulse 54      Respirations 18      Blood Pressure 150/77 Sitting, Left Arm      Pulse Oximetry 99%, m air            Pain Score      Pain Scale Used:  Numerical      Pain Intensity:  0            Fatigue Score      Fatigue (0-10 scale):  7            EXAM      General Appearance:  Positive for: Alert, Cooperative;          Negative for: Acute Distress      Neck:  Positive for: Supple;          Negative for: JVD, Masses      Respiratory:  Positive for: CTAB, Normal Respiratory Effort      Abdomen/Gastro:  Positive for: Normal Active Bowel Sounds, Soft;          Negative for: Distention, Hepatosplenomegaly, Tenderness      Cardiovascular:  Positive for: RRR;          Negative for: Gallop, Murmur, Peripheral Edema, Rub      Psychiatric:  Positive for: Appropriate Affect, Intact Judgement      Lymphatic:  Negative for: Cervical, Infraclavicular, Supraclavicular            PREVENTION      Hx Influenza Vaccination:  Yes      Date Influenza Vaccine Given:  Oct 1, 2019      Influenza Vaccine Declined:  No      2 or More Falls Past  Year?:  No      Fall Past Year with Injury?:  No      Hx Pneumococcal Vaccination:  Yes      Encouraged to follow-up with:  PCP regarding preventative exams.      Chart initiated by      sarah camarillo ma            ALLERGY/MEDS      Allergies      Coded Allergies:             NO KNOWN ALLERGIES (Unverified , 5/28/20)            Medications      Last Reconciled on 5/28/20 12:47 by ANNAMARIA HERNANDEZ      Beclomethasone Dipropionate (Qvar 40 Redihaler 10.6 GM) 10.6 Gm Hfa.aeroba      2 PUFF INH RTBID, #1 INH 5 Refills         Prov: [facility]DE[facility]A [facility]MENT [facility]         5/28/20       Tiotropium Br/Olodaterol HCl (Stiolto Respimat Inhal Spray) 4 Gm Mist.inhal      2 PUFFS INH RTQDAY for 90 Days, #3 INH 0 Refills         Prov: Guanako Phan         5/6/20       MDI-Albuterol (Ventolin HFA) 18 Gm Hfa.aer.ad      2 PUFFS INH Q6H PRN for SHORTNESS OF BREATH, #1 MDI 4 Refills         Prov: Guanako Phan         2/26/20       NEB-Albuterol Sulf (Albuterol Sulfate) 5 Mg/1 Ml Solution      2.5 MG INH RTTID, #2 BOTTLE 0 Refills         Reported         2/26/20       Albuterol/Ipratropium (Duoneb) 3 Ml Ampul.neb      3 ML INH RTTID, #120 NEB 0 Refills         Reported         2/26/20       MDI-Albuterol (Ventolin HFA) 18 Gm Hfa.aer.ad      2 PUFFS INH Q4H PRN for SHORTNESS OF BREATH, #1 INH 0 Refills         Reported         2/26/20       Carvedilol (Coreg) 6.25 Mg Tablet      12.5 MG PO HS, #120 TAB 0 Refills         Reported         12/26/19       Olmesartan (Benicar) 5 Mg Tablet      5 MG PO QDAY, #30 TAB         Reported         12/26/19       Carvedilol (Carvedilol) 6.25 Mg Tablet      6.25 MG PO QDAY, #30 TAB 0 Refills         Reported         5/6/19       Apixaban (Eliquis) 5 Mg Tablet      5 MG PO BID for 30 Days, #60 TAB         Prov: Joseph Burton         11/22/18       Rosuvastatin Calcium (Crestor*) 5 Mg Tab      5 MG PO HS, #30 TAB 0 Refills         Reported         10/12/16       Montelukast Sodium (Singulair*) 10 Mg Tab      10  MG PO QHS, TAB 0 Refills         Reported         7/9/09      Medications Reviewed:  No Changes made to meds            IMPRESSION/PLAN      Diagnosis      Bladder cancer         Malignant neoplasm of urinary bladder, unspecified site         Bladder location: unspecified site      Patient is doing well.  I see no evidence of disease recurrence by his history,     physical examination, labs or scans.  I will plan to see him back in 6 months     for ongoing surveillance with CT scans and labs prior            Notes      New Diagnostics      * CT Abd/Pelvis/Chest W/Contrast, 6 Months         Dx: Bladder cancer - C67.9      * CBC With Auto Diff, 6 Months         Dx: Bladder cancer - C67.9      * CMP Comp Metabolic Panel, 6 Months         Dx: Bladder cancer - C67.9            Patient Education      Patient Education Provided:  Yes            Electronically signed by ANNAMARIA HERNANDEZ  05/28/2020 12:47       Disclaimer: Converted document may not contain table formatting or lab diagrams. Please see [employer] for the authenticated document.

## 2023-10-13 ENCOUNTER — TELEPHONE (OUTPATIENT)
Dept: OTOLARYNGOLOGY | Facility: CLINIC | Age: 53
End: 2023-10-13

## 2023-10-13 ENCOUNTER — PRE VISIT (OUTPATIENT)
Dept: OTOLARYNGOLOGY | Facility: CLINIC | Age: 53
End: 2023-10-13

## 2023-10-13 NOTE — TELEPHONE ENCOUNTER
FUTURE VISIT INFORMATION      FUTURE VISIT INFORMATION:  Date: 10/30/23  Time: 9 AM  [place] [facility] [place]  REFERRAL INFORMATION:  Referring provider:  Regi Winters CNP  Referring providers clinic:  [facility]   Reason for visit/diagnosis  Lymphadenopathy/Thyroid nodule- Referred by Regi Winters CNP in [facility]     RECORDS REQUESTED FROM:             Clinic name Comments Records Status Imaging Status   [facility]  9/26/23 OV note- Regi Winters CNP  [facility] Imaging US Thyroid 10/14/23 - scheduled  US head neck 10/1/23  CT neck 12/4/22  US Thyroid  11/11/22 [facility] PACS   [facility] Ear Nose and Thro[facility] [place] 11/23/22 note- Kim Hagan NP    Epic

## 2023-10-14 ENCOUNTER — HOSPITAL ENCOUNTER (OUTPATIENT)
Dept: ULTRASOUND IMAGING | Facility: HOSPITAL | Age: 53
Discharge: HOME | End: 2023-10-14
Attending: PHYSICIAN ASSISTANT | Admitting: PHYSICIAN ASSISTANT
Payer: COMMERCIAL

## 2023-10-14 DIAGNOSIS — E04.1 THYROID NODULE: ICD-10-CM

## 2023-10-14 PROCEDURE — 76536 US EXAM OF HEAD AND NECK: CPT

## 2023-10-17 ENCOUNTER — PATIENT OUTREACH (OUTPATIENT)
Dept: OTOLARYNGOLOGY | Facility: CLINIC | Age: 53
End: 2023-10-17

## 2023-10-17 ENCOUNTER — APPOINTMENT (OUTPATIENT)
Dept: URBAN - METROPOLITAN AREA CLINIC 260 | Age: 53
Setting detail: DERMATOLOGY
End: 2023-10-18

## 2023-10-17 VITALS — WEIGHT: 135 LBS | HEIGHT: 68 IN

## 2023-10-17 DIAGNOSIS — L82.0 INFLAMED SEBORRHEIC KERATOSIS: ICD-10-CM

## 2023-10-17 DIAGNOSIS — L57.0 ACTINIC KERATOSIS: ICD-10-CM

## 2023-10-17 DIAGNOSIS — D18.0 HEMANGIOMA: ICD-10-CM

## 2023-10-17 DIAGNOSIS — L81.4 OTHER MELANIN HYPERPIGMENTATION: ICD-10-CM

## 2023-10-17 DIAGNOSIS — Z85.828 PERSONAL HISTORY OF OTHER MALIGNANT NEOPLASM OF SKIN: ICD-10-CM

## 2023-10-17 DIAGNOSIS — D22 MELANOCYTIC NEVI: ICD-10-CM

## 2023-10-17 DIAGNOSIS — L82.1 OTHER SEBORRHEIC KERATOSIS: ICD-10-CM

## 2023-10-17 DIAGNOSIS — L57.8 OTHER SKIN CHANGES DUE TO CHRONIC EXPOSURE TO NONIONIZING RADIATION: ICD-10-CM

## 2023-10-17 PROBLEM — D18.01 HEMANGIOMA OF SKIN AND SUBCUTANEOUS TISSUE: Status: ACTIVE | Noted: 2023-10-17

## 2023-10-17 PROBLEM — D22.5 MELANOCYTIC NEVI OF TRUNK: Status: ACTIVE | Noted: 2023-10-17

## 2023-10-17 PROCEDURE — OTHER MIPS QUALITY: OTHER

## 2023-10-17 PROCEDURE — 17110 DESTRUCT B9 LESION 1-14: CPT

## 2023-10-17 PROCEDURE — 17003 DESTRUCT PREMALG LES 2-14: CPT | Mod: 59

## 2023-10-17 PROCEDURE — OTHER COUNSELING: OTHER

## 2023-10-17 PROCEDURE — OTHER ADDITIONAL NOTES: OTHER

## 2023-10-17 PROCEDURE — OTHER LIQUID NITROGEN: OTHER

## 2023-10-17 PROCEDURE — 99213 OFFICE O/P EST LOW 20 MIN: CPT | Mod: 25

## 2023-10-17 PROCEDURE — 17000 DESTRUCT PREMALG LESION: CPT | Mod: 59

## 2023-10-17 ASSESSMENT — LOCATION SIMPLE DESCRIPTION DERM
LOCATION SIMPLE: RIGHT UPPER BACK
LOCATION SIMPLE: RIGHT PRETIBIAL REGION
LOCATION SIMPLE: LEFT NOSE
LOCATION SIMPLE: UPPER BACK
LOCATION SIMPLE: LEFT UPPER BACK
LOCATION SIMPLE: LEFT PRETIBIAL REGION
LOCATION SIMPLE: NOSE

## 2023-10-17 ASSESSMENT — LOCATION DETAILED DESCRIPTION DERM
LOCATION DETAILED: INFERIOR THORACIC SPINE
LOCATION DETAILED: RIGHT MID-UPPER BACK
LOCATION DETAILED: LEFT DISTAL PRETIBIAL REGION
LOCATION DETAILED: LEFT NASAL ALA
LOCATION DETAILED: LEFT SUPERIOR UPPER BACK
LOCATION DETAILED: RIGHT SUPERIOR UPPER BACK
LOCATION DETAILED: LEFT SUPERIOR MEDIAL UPPER BACK
LOCATION DETAILED: NASAL ROOT
LOCATION DETAILED: RIGHT PROXIMAL PRETIBIAL REGION
LOCATION DETAILED: NASAL DORSUM

## 2023-10-17 ASSESSMENT — LOCATION ZONE DERM
LOCATION ZONE: NOSE
LOCATION ZONE: TRUNK
LOCATION ZONE: LEG

## 2023-10-17 NOTE — PROCEDURE: LIQUID NITROGEN
Duration Of Freeze Thaw-Cycle (Seconds): 10
Render Note In Bullet Format When Appropriate: Yes
Post-Care Instructions: I reviewed with the patient in detail post-care instructions. Patient is to wear sunprotection, and avoid picking at any of the treated lesions. Pt may apply Vaseline to crusted or scabbing areas.
Detail Level: Detailed
Consent: The patient's consent was obtained including but not limited to risks of crusting, scabbing, blistering, scarring, darker or lighter pigmentary change, recurrence, incomplete removal and infection.
Application Tool (Optional): Cry-AC
Number Of Freeze-Thaw Cycles: 2 freeze-thaw cycles
Spray Paint Technique: No
Spray Paint Text: The liquid nitrogen was applied to the skin utilizing a spray paint frosting technique.
Medical Necessity Clause: This procedure was medically necessary because the lesions that were treated were:
Medical Necessity Information: It is in your best interest to select a reason for this procedure from the list below. All of these items fulfill various CMS LCD requirements except the new and changing color options.

## 2023-10-17 NOTE — PROCEDURE: COUNSELING
Detail Level: Detailed
Nicotinamide Supplementation Recommendations: 500mg bid
Detail Level: Generalized
Detail Level: Zone

## 2023-10-17 NOTE — PROCEDURE: ADDITIONAL NOTES
Detail Level: Simple
Render Risk Assessment In Note?: no
Additional Notes: Patient is recommended to try keratolytic topicals.

## 2023-10-17 NOTE — TELEPHONE ENCOUNTER
Called and spoke with patient regarding need to return for follow-up with Brittany Hagan instead of seeing a surgeon based on her Thyroid US and neck ultrasound findings. Advised that if there is any need for additional work-up, the NP can plan for that and will have her see a surgeon if that is needed but currently based on results, there is not a need to see a surgeon. Patient agreeable and will keep appointment for November as scheduled with Brittany Hagan.   She was encouraged to call with further questions or concerns.     Karley Robles, RN, BSN

## 2023-10-23 ENCOUNTER — OFFICE VISIT (OUTPATIENT)
Dept: OTOLARYNGOLOGY | Facility: CLINIC | Age: 53
End: 2023-10-23
Payer: COMMERCIAL

## 2023-10-23 VITALS
SYSTOLIC BLOOD PRESSURE: 105 MMHG | WEIGHT: 125 LBS | BODY MASS INDEX: 18.94 KG/M2 | HEART RATE: 71 BPM | DIASTOLIC BLOOD PRESSURE: 70 MMHG | HEIGHT: 68 IN

## 2023-10-23 DIAGNOSIS — R59.1 LYMPHADENOPATHY: Primary | ICD-10-CM

## 2023-10-23 DIAGNOSIS — R59.1 LYMPHADENOPATHY: ICD-10-CM

## 2023-10-23 DIAGNOSIS — R59.9 ENLARGED LYMPH NODES: Primary | ICD-10-CM

## 2023-10-23 PROCEDURE — 99203 OFFICE O/P NEW LOW 30 MIN: CPT | Performed by: STUDENT IN AN ORGANIZED HEALTH CARE EDUCATION/TRAINING PROGRAM

## 2023-10-23 ASSESSMENT — PAIN SCALES - GENERAL: PAINLEVEL: NO PAIN (0)

## 2023-10-23 NOTE — LETTER
10/23/2023       RE: Luz Sharpe  [address]  [facility] [place] [address]     Dear Colleague,    Thank you for referring your patient, Luz Sharpe, to the [facility] NOSE AND [facility] [place] at [facility]. Please see a copy of my visit note below.    Head and Neck Surgery  10/26/23    I the pleasure meeting Ms. brown today in clinic.  She is a pleasant 52-year-old woman referred for evaluation of a right level 2 lymph node.  She says that she can feel this.  Aside from that she has no symptoms related to it.  She has no head and neck symptoms.  She had a recent ultrasound that showed a 2 cm lymph node in this area.    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  She does not drink alcohol    Family history:  None    12 point review of system was performed and is negative  asides from that in the HPI    Physical examination:  Alert and in no acute distress   no palpable cervical adenopathy  I think the area she is palpating is her carotid body  She has no lesions in the oral cavity or oropharynx    Assessment and plan:  52-year-old woman referred for a right level 2 lymph node.  I think the mass she is palpating in her neck is her carotid body.  Nevertheless she has an enlarged right level 2 lymph node.  We discussed options of continued observation versus fine-needle aspirate.  She would like to proceed with a fine-needle aspirate.  We will refer her to interventional radiology to have this done.  We will keep her updated on the results.    Abrahan Schafer MD    30 minutes spent on the date of the encounter in chart review, patient visit, review of tests, documentation and/or discussion with other providers about the issues documented above.           Again, thank you for  allowing me to participate in the care of your patient.      Sincerely,    Abrahan Schafer MD

## 2023-10-23 NOTE — CONSULTS
"    Outpatient NEURO RAD Referral  10/23/23    Referring Provider: Abrahan Schafer MD in [facility]   NEURO RAD Referral Request: \"right level 2 lymph node\"    Procedure Approved for:  US-guided right level 2 lymph node FNA. Case and imaging was reviewed with Neuro Rad providers, Dr. Garrido and Dr. Rajan..    Brief History:    Luz Sharpe is a 52 year old female with no pertinent PMHx except for an enlarged right level 2 lymph node (patient complaining of lump in her neck for the past 4 months). She was empirically started on Augmentin by her PCP a month ago, at the time denies symptoms of URI. Mass is non-tender and mobile. ENT has requested an FNA.     Pertinent Imaging Reviewed:    US HEAD NECK SOFT TISSUE  FINDINGS AND IMPRESSION:  There is a 2.0 x 0.9 x 0.6 cm hypoechoic structure in the right superior neck in the area of a palpable lump. This could represent an enlarged lymph node. Enlarged lymph nodes may be reactive. This lymph node was not seen on the comparison CT study from   10 months earlier. Tissue sampling could be performed if there is clinical concern or if this does not resolve.    Procedure order and surgical pathology order placed.    If requesting team would like sample sent for anything else please use the IR order set \"IR RAD Biopsy or Fluid Aspirate Specimens\" to select your necessary diagnostic labs and pend for admission.   If there are labs you desire that are not found in this order set or you have questions regarding specific diagnostic labs please call the associated lab personnel.     Requesting team made aware of Neuro Rad recommendations via Epic messaging.    What is the reason for the IR order request?   Biopsy            What type of biopsy is needed?   Lymph Node            Location of the Lymph Node?   Right level 2 lymph node            Patients clinical information/history?   Lymphadenopathy            Is this biopsy procedure for research?   No            Specimen " orders should be placed per site protocol   Acknowledge            Is there a specific location the exam needs to be scheduled at?   [place]            Call Back #   [phone]            Is the patient pregnant?         Is the patient on aspirin, Plavix or blood thinners?   No          Hui Blackmon PA-C  Interventional Radiology   IR on-call pager: [phone]

## 2023-10-23 NOTE — NURSING NOTE
"Chief Complaint   Patient presents with    Consult       Blood pressure 105/70, pulse 71, height 1.727 m (5' 8\"), weight 56.7 kg (125 lb).    Alise Blancas, EMT    "

## 2023-10-23 NOTE — PATIENT INSTRUCTIONS
"You were seen in the clinic today by Dr. Schafer. If you have any questions or concerns after your appointment, please call the clinic at [phone]. Press \"1\" for scheduling, press \"3\" for nurse advice.    2.   The following has been recommended for you based upon your appointment today:   -FNA of the right level 2 lymph node with Interventional Radiology    3.   We will call you after the FNA with results    Dionne PEREZ, RN  RN Care Coordinator, ENT Clinic  [facility]  Direct: [phone]  "

## 2023-10-26 NOTE — PROGRESS NOTES
Head and Neck Surgery  10/26/23    I the pleasure meeting Ms. brown today in clinic.  She is a pleasant 52-year-old woman referred for evaluation of a right level 2 lymph node.  She says that she can feel this.  Aside from that she has no symptoms related to it.  She has no head and neck symptoms.  She had a recent ultrasound that showed a 2 cm lymph node in this area.    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  She does not drink alcohol    Family history:  None    12 point review of system was performed and is negative  asides from that in the HPI    Physical examination:  Alert and in no acute distress   no palpable cervical adenopathy  I think the area she is palpating is her carotid body  She has no lesions in the oral cavity or oropharynx    Assessment and plan:  52-year-old woman referred for a right level 2 lymph node.  I think the mass she is palpating in her neck is her carotid body.  Nevertheless she has an enlarged right level 2 lymph node.  We discussed options of continued observation versus fine-needle aspirate.  She would like to proceed with a fine-needle aspirate.  We will refer her to interventional radiology to have this done.  We will keep her updated on the results.    Abrahan Schafer MD    30 minutes spent on the date of the encounter in chart review, patient visit, review of tests, documentation and/or discussion with other providers about the issues documented above.

## 2023-10-30 ENCOUNTER — PRE VISIT (OUTPATIENT)
Dept: OTOLARYNGOLOGY | Facility: CLINIC | Age: 53
End: 2023-10-30

## 2023-11-08 ENCOUNTER — MYC MEDICAL ADVICE (OUTPATIENT)
Dept: INTERVENTIONAL RADIOLOGY/VASCULAR | Facility: CLINIC | Age: 53
End: 2023-11-08

## 2023-11-10 ENCOUNTER — APPOINTMENT (OUTPATIENT)
Dept: MEDSURG UNIT | Facility: CLINIC | Age: 53
End: 2023-11-10
Attending: STUDENT IN AN ORGANIZED HEALTH CARE EDUCATION/TRAINING PROGRAM
Payer: COMMERCIAL

## 2023-11-10 ENCOUNTER — APPOINTMENT (OUTPATIENT)
Dept: INTERVENTIONAL RADIOLOGY/VASCULAR | Facility: CLINIC | Age: 53
End: 2023-11-10
Payer: COMMERCIAL

## 2023-11-10 ENCOUNTER — HOSPITAL ENCOUNTER (OUTPATIENT)
Facility: CLINIC | Age: 53
Discharge: HOME | End: 2023-11-10
Attending: STUDENT IN AN ORGANIZED HEALTH CARE EDUCATION/TRAINING PROGRAM | Admitting: RADIOLOGY
Payer: COMMERCIAL

## 2023-11-10 VITALS
RESPIRATION RATE: 16 BRPM | WEIGHT: 130 LBS | OXYGEN SATURATION: 96 % | DIASTOLIC BLOOD PRESSURE: 71 MMHG | SYSTOLIC BLOOD PRESSURE: 100 MMHG | BODY MASS INDEX: 19.77 KG/M2 | TEMPERATURE: 98 F | HEART RATE: 68 BPM

## 2023-11-10 DIAGNOSIS — R59.9 ENLARGED LYMPH NODES: ICD-10-CM

## 2023-11-10 LAB
ERYTHROCYTE [DISTWIDTH] IN BLOOD BY AUTOMATED COUNT: 12.8 % (ref 10–15)
HCT VFR BLD AUTO: 42.5 % (ref 35–47)
HGB BLD-MCNC: 14 G/DL (ref 11.7–15.7)
INR PPP: 1.18 (ref 0.85–1.15)
MCH RBC QN AUTO: 28.7 PG (ref 26.5–33)
MCHC RBC AUTO-ENTMCNC: 32.9 G/DL (ref 31.5–36.5)
MCV RBC AUTO: 87 FL (ref 78–100)
PLATELET # BLD AUTO: 211 10E3/UL (ref 150–450)
RBC # BLD AUTO: 4.88 10E6/UL (ref 3.8–5.2)
WBC # BLD AUTO: 4.7 10E3/UL (ref 4–11)

## 2023-11-10 PROCEDURE — 36415 COLL VENOUS BLD VENIPUNCTURE: CPT | Performed by: NURSE PRACTITIONER

## 2023-11-10 PROCEDURE — 999N000142 HC STATISTIC PROCEDURE PREP ONLY

## 2023-11-10 PROCEDURE — 999N000132 HC STATISTIC PP CARE STAGE 1

## 2023-11-10 PROCEDURE — 99152 MOD SED SAME PHYS/QHP 5/>YRS: CPT | Performed by: RADIOLOGY

## 2023-11-10 PROCEDURE — 10005 FNA BX W/US GDN 1ST LES: CPT

## 2023-11-10 PROCEDURE — 88185 FLOWCYTOMETRY/TC ADD-ON: CPT | Performed by: STUDENT IN AN ORGANIZED HEALTH CARE EDUCATION/TRAINING PROGRAM

## 2023-11-10 PROCEDURE — 88173 CYTOPATH EVAL FNA REPORT: CPT | Mod: 26 | Performed by: PATHOLOGY

## 2023-11-10 PROCEDURE — 258N000003 HC RX IP 258 OP 636: Performed by: NURSE PRACTITIONER

## 2023-11-10 PROCEDURE — 88305 TISSUE EXAM BY PATHOLOGIST: CPT | Mod: 26 | Performed by: PATHOLOGY

## 2023-11-10 PROCEDURE — 250N000011 HC RX IP 250 OP 636: Performed by: STUDENT IN AN ORGANIZED HEALTH CARE EDUCATION/TRAINING PROGRAM

## 2023-11-10 PROCEDURE — 88173 CYTOPATH EVAL FNA REPORT: CPT | Mod: TC | Performed by: STUDENT IN AN ORGANIZED HEALTH CARE EDUCATION/TRAINING PROGRAM

## 2023-11-10 PROCEDURE — 85027 COMPLETE CBC AUTOMATED: CPT | Performed by: NURSE PRACTITIONER

## 2023-11-10 PROCEDURE — 10005 FNA BX W/US GDN 1ST LES: CPT | Performed by: RADIOLOGY

## 2023-11-10 PROCEDURE — 88189 FLOWCYTOMETRY/READ 16 & >: CPT | Mod: GC | Performed by: STUDENT IN AN ORGANIZED HEALTH CARE EDUCATION/TRAINING PROGRAM

## 2023-11-10 PROCEDURE — 250N000009 HC RX 250: Performed by: STUDENT IN AN ORGANIZED HEALTH CARE EDUCATION/TRAINING PROGRAM

## 2023-11-10 PROCEDURE — 88172 CYTP DX EVAL FNA 1ST EA SITE: CPT | Mod: 26 | Performed by: PATHOLOGY

## 2023-11-10 PROCEDURE — 85610 PROTHROMBIN TIME: CPT | Performed by: NURSE PRACTITIONER

## 2023-11-10 RX ORDER — NALOXONE HYDROCHLORIDE 0.4 MG/ML
0.4 INJECTION, SOLUTION INTRAMUSCULAR; INTRAVENOUS; SUBCUTANEOUS
Status: DISCONTINUED | OUTPATIENT
Start: 2023-11-10 | End: 2023-11-10 | Stop reason: HOSPADM

## 2023-11-10 RX ORDER — NALOXONE HYDROCHLORIDE 0.4 MG/ML
0.2 INJECTION, SOLUTION INTRAMUSCULAR; INTRAVENOUS; SUBCUTANEOUS
Status: DISCONTINUED | OUTPATIENT
Start: 2023-11-10 | End: 2023-11-10 | Stop reason: HOSPADM

## 2023-11-10 RX ORDER — FLUMAZENIL 0.1 MG/ML
0.2 INJECTION, SOLUTION INTRAVENOUS
Status: DISCONTINUED | OUTPATIENT
Start: 2023-11-10 | End: 2023-11-10 | Stop reason: HOSPADM

## 2023-11-10 RX ORDER — LIDOCAINE 40 MG/G
CREAM TOPICAL
Status: DISCONTINUED | OUTPATIENT
Start: 2023-11-10 | End: 2023-11-10 | Stop reason: HOSPADM

## 2023-11-10 RX ORDER — SODIUM CHLORIDE 9 MG/ML
INJECTION, SOLUTION INTRAVENOUS CONTINUOUS
Status: DISCONTINUED | OUTPATIENT
Start: 2023-11-10 | End: 2023-11-10 | Stop reason: HOSPADM

## 2023-11-10 RX ORDER — FENTANYL CITRATE 50 UG/ML
25-50 INJECTION, SOLUTION INTRAMUSCULAR; INTRAVENOUS EVERY 5 MIN PRN
Status: DISCONTINUED | OUTPATIENT
Start: 2023-11-10 | End: 2023-11-10 | Stop reason: HOSPADM

## 2023-11-10 RX ADMIN — MIDAZOLAM HYDROCHLORIDE 0.5 MG: 1 INJECTION, SOLUTION INTRAMUSCULAR; INTRAVENOUS at 12:03

## 2023-11-10 RX ADMIN — MIDAZOLAM HYDROCHLORIDE 1 MG: 1 INJECTION, SOLUTION INTRAMUSCULAR; INTRAVENOUS at 11:57

## 2023-11-10 RX ADMIN — FENTANYL CITRATE 25 MCG: 50 INJECTION, SOLUTION INTRAMUSCULAR; INTRAVENOUS at 12:20

## 2023-11-10 RX ADMIN — FENTANYL CITRATE 50 MCG: 50 INJECTION, SOLUTION INTRAMUSCULAR; INTRAVENOUS at 11:57

## 2023-11-10 RX ADMIN — SODIUM CHLORIDE: 9 INJECTION, SOLUTION INTRAVENOUS at 10:17

## 2023-11-10 RX ADMIN — LIDOCAINE HYDROCHLORIDE 5 ML: 10 INJECTION, SOLUTION EPIDURAL; INFILTRATION; INTRACAUDAL; PERINEURAL at 12:12

## 2023-11-10 RX ADMIN — FENTANYL CITRATE 25 MCG: 50 INJECTION, SOLUTION INTRAMUSCULAR; INTRAVENOUS at 12:03

## 2023-11-10 ASSESSMENT — ACTIVITIES OF DAILY LIVING (ADL)
ADLS_ACUITY_SCORE: 35
ADLS_ACUITY_SCORE: 35

## 2023-11-10 NOTE — PROGRESS NOTES
Patient Name: Luz Sharpe  Medical Record Number: [id number]  Today's Date: 11/10/2023    Procedure: Fine needle aspiration of right cervical lymph node   Proceduralist: Dr. Rajan  Pathology present: Yes    Procedure Start: 1204  Procedure end: 1226  Sedation medications administered: Midazolam 1.5 mg, Fentanyl 100 mcg    Report given to: ANGELA Nettles 2A  : N/A    Other Notes: Pt arrived to IR room 6 from . Consent reviewed. Pt denies any questions or concerns regarding procedure. Pt positioned supine and monitored per protocol.     4 samples obtained for fine needle aspiration (FNA) and sent to lab as ordered.   Primapore placed over biopsy needle track.  Hemostasis achieved.    Patient to be on bedrest for 1 hour ending at 1326 per Dr. Rajan.     Pt tolerated procedure without any noted complications. Pt transferred back to .

## 2023-11-10 NOTE — DISCHARGE INSTRUCTIONS
[facility]    Interventional Radiology  Patient Instructions Following Fine Needle Aspiration    AFTER YOU GO HOME  If you were given sedation, for the first 24 hours: we recommend that an adult stay with you, DO NOT drive or operate machinery at home or at work, DO NOT smoke, DO NOT make any important or legal decisions.  DO NOT drink alcoholic beverages the day of your procedure  Drink plenty of fluids   Resume your regular diet, unless otherwise instructed by your Primary Physician  Relax and take it easy for 48 hours  DO NOT do any strenuous exercise or lifting (> 10 lbs) for at least 3 days following your procedure  Keep the dressing dry and in place for 24 hours. Replace with Band aid for 2 days.  Never leave a wet dressing in place.  Do not take a shower for at least 12 hours following your procedure. No tub bath, hot tub, or swimming for 5 days.  There should be minimum drainage from the biopsy site    CALL THE PHYSICIAN IF:  You start bleeding from the procedure site.  If you do start to bleed from that site, lie down flat and hold pressure on the site for a minimum of 10 minutes.  Your physician will tell you if you need to return to the hospital  You develop nausea or vomiting  You have excessive swelling, redness, or tenderness at the site  You have drainage that looks like it is infected.  You experience severe pain  You develop hives or a rash or unexplained itching  You develop shortness of breath  You develop a temperature of 101 degrees F or greater      ADDITIONAL INSTRUCTIONS: none    [facility] INTERVENTIONAL RADIOLOGY DEPARTMENT  Procedure Physician:     Dr. Rajan          Date of procedure: November 10, 2023  Telephone Numbers: [phone]    Monday-Friday 7:30 am to 4:00 pm  [phone] After 4:00 pm Monday-Friday, Weekends & Holidays.   Ask for the Neuro Radiologist on call.  Someone is on call 24 hrs/day  [facility] toll free number: [phone] Monday-Friday 8:00 am to 4:30  pm  [facility] Emergency Dept: [phone]

## 2023-11-10 NOTE — PROGRESS NOTES
Pt tolerated recovery without complication. Discharge instructions reviewed, copy given to pt. Pt tolerated oral intake and ambulation. YISSEL friedman. Pt discharged home accompanied by son.

## 2023-11-10 NOTE — SEDATION DOCUMENTATION
[facility] Procedure Note        Sedation:      Performed by: Remi Kiran MD  Authorized by: Remi Kiran MD    Pre-Procedure Assessment done at 1000.    Expected Level:  Moderate Sedation    Indication:  Sedation is required to allow for biopsy    Consent obtained from patient after discussing the risks, benefits and alternatives.    PO Intake:  Appropriately NPO for procedure    ASA Class:  Class 2 - MILD SYSTEMIC DISEASE, NO ACUTE PROBLEMS, NO FUNCTIONAL LIMITATIONS.    Mallampati:  Grade 2:  Soft palate, base of uvula, tonsillar pillars, and portion of posterior pharyngeal wall visible    Lungs: Lungs Clear with good breath sounds bilaterally.     Heart: Normal heart sounds and rate    History and physical reviewed and no updates needed. I have reviewed the lab findings, diagnostic data, medications, and the plan for sedation. I have determined this patient to be an appropriate candidate for the planned sedation and procedure and have reassessed the patient IMMEDIATELY PRIOR to sedation and procedure.

## 2023-11-13 ENCOUNTER — TELEPHONE (OUTPATIENT)
Dept: INTERVENTIONAL RADIOLOGY/VASCULAR | Facility: CLINIC | Age: 53
End: 2023-11-13

## 2023-11-13 LAB

## 2023-11-13 NOTE — TELEPHONE ENCOUNTER
POST CALL    Spoke with: Luz Sharpe  Call attempt: 1  Message left: No  Any pain: No  Any fever: No  Any redness/swelling/ abnormal drainage around puncture site: No  Were you instructed well enough to take care of yourself at home: Yes  Are you satisfied with the care you received: Yes  Any additional concerns or questions: No  IR nurse triage line number provided: Yes    Post call completed.   November 13, 2023 9:20 AM  Fara Rojas RN

## 2024-01-08 ENCOUNTER — TRANSFERRED RECORDS (OUTPATIENT)
Dept: HEALTH INFORMATION MANAGEMENT | Facility: CLINIC | Age: 54
End: 2024-01-08
Payer: COMMERCIAL

## 2024-03-26 ENCOUNTER — OFFICE VISIT (OUTPATIENT)
Dept: PEDIATRICS | Facility: CLINIC | Age: 54
End: 2024-03-26
Payer: COMMERCIAL

## 2024-03-26 VITALS
RESPIRATION RATE: 20 BRPM | WEIGHT: 129.8 LBS | SYSTOLIC BLOOD PRESSURE: 110 MMHG | OXYGEN SATURATION: 100 % | DIASTOLIC BLOOD PRESSURE: 64 MMHG | BODY MASS INDEX: 20.37 KG/M2 | HEIGHT: 67 IN | TEMPERATURE: 97.6 F | HEART RATE: 75 BPM

## 2024-03-26 DIAGNOSIS — R79.1 ELEVATED INR: ICD-10-CM

## 2024-03-26 DIAGNOSIS — N83.209 CYST OF OVARY, UNSPECIFIED LATERALITY: ICD-10-CM

## 2024-03-26 DIAGNOSIS — Z01.818 PREOP GENERAL PHYSICAL EXAM: Primary | ICD-10-CM

## 2024-03-26 LAB — INR PPP: 1.17 (ref 0.85–1.15)

## 2024-03-26 PROCEDURE — 99214 OFFICE O/P EST MOD 30 MIN: CPT | Performed by: NURSE PRACTITIONER

## 2024-03-26 PROCEDURE — 85610 PROTHROMBIN TIME: CPT | Performed by: NURSE PRACTITIONER

## 2024-03-26 PROCEDURE — 36415 COLL VENOUS BLD VENIPUNCTURE: CPT | Performed by: NURSE PRACTITIONER

## 2024-03-26 ASSESSMENT — PAIN SCALES - GENERAL: PAINLEVEL: NO PAIN (0)

## 2024-03-26 NOTE — PROGRESS NOTES
Preoperative Evaluation  [facility] [place]  [address]  [address]  [place] [address]  Phone: [phone]  Fax: [phone]  Primary Provider: No Ref-Primary, Physician  Pre-op Performing Provider: YOANNA COTO 26, 2024       Luz is a 53 year old, presenting for the following:  Pre-Op Exam        3/26/2024    10:24 AM   Additional Questions   Roomed by Dedrick Hodge   Accompanied by N/A         3/26/2024    10:24 AM   Patient Reported Additional Medications   Patient reports taking the following new medications No     Surgical Information  Surgery/Procedure: HYSTERECTOMY,   Surgery Location: [facility]  Surgeon: Negin Pink MD   Surgery Date: 4/17/2024  Time of Surgery: 7:20 AM  Where patient plans to recover: At home with family  Fax number for surgical facility: Note does not need to be faxed, will be available electronically in Epic.    Assessment & Plan     The proposed surgical procedure is considered LOW risk.    Preop general physical exam  Cyst of ovary, unspecified laterality  Medically optimized for surgery. See INR below  - INR; Future  - INR    (R79.1) Elevated INR  Comment: Pt had INR checked prior to a thyroid biopsy and was very slightly elevated. Pt without any bleeding/bruising issues. INR was just barely elevated, so likely not significant. This should not impact her surgical clearance. However, I'd like her to get follow-up labs just to be sure there isn't an underlying issue.  Plan: Partial thromboplastin time, Hepatic panel         (Albumin, ALT, AST, Bili, Alk Phos, TP),         Hemoglobin                 - No identified additional risk factors other than previously addressed    Antiplatelet or Anticoagulation Medication Instructions   - Patient is on no antiplatelet or anticoagulation medications.    Additional Medication Instructions  Patient is to take all scheduled medications on the day of  surgery    Recommendation  APPROVAL GIVEN to proceed with proposed procedure, without further diagnostic evaluation.          Subjective       Via the Health Maintenance questionnaire, the patient has reported the following services have been completed -Mammogram-Cervical Cancer Screening, this information has been sent to the abstraction team.  HPI related to upcoming procedure:         3/26/2024    10:27 AM   Preop Questions   1. Have you ever had a heart attack or stroke? No   2. Have you ever had surgery on your heart or blood vessels, such as a stent placement, a coronary artery bypass, or surgery on an artery in your head, neck, heart, or legs? No   3. Do you have chest pain with activity? No   4. Do you have a history of  heart failure? No   5. Do you currently have a cold, bronchitis or symptoms of other infection? No   6. Do you have a cough, shortness of breath, or wheezing? No   7. Do you or anyone in your family have previous history of blood clots? No   8. Do you or does anyone in your family have a serious bleeding problem such as prolonged bleeding following surgeries or cuts? No   9. Have you ever had problems with anemia or been told to take iron pills? No   10. Have you had any abnormal blood loss such as black, tarry or bloody stools, or abnormal vaginal bleeding? No   11. Have you ever had a blood transfusion? No   12. Are you willing to have a blood transfusion if it is medically needed before, during, or after your surgery? Yes   13. Have you or any of your relatives ever had problems with anesthesia? No   14. Do you have sleep apnea, excessive snoring or daytime drowsiness? No   15. Do you have any artifical heart valves or other implanted medical devices like a pacemaker, defibrillator, or continuous glucose monitor? No   16. Do you have artificial joints? No   17. Are you allergic to latex? No   18. Is there any chance that you may be pregnant? No       Preoperative Review of     "reviewed - no record of controlled substances prescribed.          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 MAY REPEAT IF NEEDED      traZODone (DESYREL) 100 MG tablet Take 100 mg by mouth At Bedtime      Ascorbic Acid (VITAMIN C PO) Take by mouth daily (Patient not taking: Reported on 3/26/2024)      folic acid-vit B6-vit B12 (FOLGARD) 0.8-10-0.115 MG TABS per tablet Take 1 tablet by mouth daily (Patient not taking: Reported on 3/26/2024)      spironolactone (ALDACTONE) 50 MG tablet TAKE 1 TABLET BY MOUTH EVERY DAY FOR ACNE (Patient not taking: Reported on 3/26/2024)         Allergies   Allergen Reactions    Fish Oil Swelling    Fish Oil Unknown        Social History     Tobacco Use    Smoking status: Never     Passive exposure: Never    Smokeless tobacco: Never   Substance Use Topics    Alcohol use: Not on file       History   Drug Use Not on file         Review of Systems      Objective    /64 (BP Location: Right arm, Patient Position: Sitting, Cuff Size: Adult Regular)   Pulse 75   Temp 97.6  F (36.4  C) (Tympanic)   Resp 20   Ht 1.699 m (5' 6.89\")   Wt 58.9 kg (129 lb 12.8 oz)   SpO2 100%   BMI 20.40 kg/m     Estimated body mass index is 20.4 kg/m  as calculated from the following:    Height as of this encounter: 1.699 m (5' 6.89\").    Weight as of this encounter: 58.9 kg (129 lb 12.8 oz).  Physical Exam      Recent Labs   Lab Test 11/10/23  1006 " 09/25/23  1423 11/09/22  1131   HGB 14.0 14.7 13.4    240 219   INR 1.18*  --   --    NA  --   --  138   POTASSIUM  --   --  3.9   CR  --   --  0.72        Diagnostics  Labs pending at this time.  Results will be reviewed when available.   No EKG required, no history of coronary heart disease, significant arrhythmia, peripheral arterial disease or other structural heart disease.    Revised Cardiac Risk Index (RCRI)  The patient has the following serious cardiovascular risks for perioperative complications:   - No serious cardiac risks = 0 points     RCRI Interpretation: 0 points: Class I (very low risk - 0.4% complication rate)         Signed Electronically by: JOANNE ZHOU CNP  Copy of this evaluation report is provided to requesting physician.

## 2024-04-08 ENCOUNTER — LAB (OUTPATIENT)
Dept: LAB | Facility: CLINIC | Age: 54
End: 2024-04-08
Payer: COMMERCIAL

## 2024-04-08 DIAGNOSIS — R79.1 ELEVATED INR: ICD-10-CM

## 2024-04-08 LAB
APTT PPP: 27 SECONDS (ref 22–38)
HGB BLD-MCNC: 13.8 G/DL (ref 11.7–15.7)

## 2024-04-08 PROCEDURE — 36415 COLL VENOUS BLD VENIPUNCTURE: CPT

## 2024-04-08 PROCEDURE — 85730 THROMBOPLASTIN TIME PARTIAL: CPT

## 2024-04-08 PROCEDURE — 85018 HEMOGLOBIN: CPT

## 2024-04-08 PROCEDURE — 80076 HEPATIC FUNCTION PANEL: CPT

## 2024-04-09 LAB
ALBUMIN SERPL BCG-MCNC: 4.7 G/DL (ref 3.5–5.2)
ALP SERPL-CCNC: 60 U/L (ref 40–150)
ALT SERPL W P-5'-P-CCNC: 22 U/L (ref 0–50)
AST SERPL W P-5'-P-CCNC: 29 U/L (ref 0–45)
BILIRUB DIRECT SERPL-MCNC: 0.24 MG/DL (ref 0–0.3)
BILIRUB SERPL-MCNC: 0.8 MG/DL
PROT SERPL-MCNC: 7 G/DL (ref 6.4–8.3)

## 2024-04-15 ENCOUNTER — MYC MEDICAL ADVICE (OUTPATIENT)
Dept: PEDIATRICS | Facility: CLINIC | Age: 54
End: 2024-04-15
Payer: COMMERCIAL

## 2024-04-15 DIAGNOSIS — R79.1 ELEVATED INR: Primary | ICD-10-CM

## 2024-04-15 PROCEDURE — 99207 E-CONSULT TO HEMATOLOGY (ADULT OUTPT PROVIDER TO SPECIALIST WRITTEN QUESTION & RESPONSE): CPT | Performed by: NURSE PRACTITIONER

## 2024-04-16 ENCOUNTER — ANESTHESIA EVENT (OUTPATIENT)
Dept: SURGERY | Facility: HOSPITAL | Age: 54
End: 2024-04-16
Payer: COMMERCIAL

## 2024-04-16 ENCOUNTER — TELEPHONE (OUTPATIENT)
Dept: PEDIATRICS | Facility: CLINIC | Age: 54
End: 2024-04-16

## 2024-04-16 ENCOUNTER — E-CONSULT (OUTPATIENT)
Dept: HEMATOLOGY | Facility: CLINIC | Age: 54
End: 2024-04-16
Payer: COMMERCIAL

## 2024-04-16 PROCEDURE — 99451 NTRPROF PH1/NTRNET/EHR 5/>: CPT | Performed by: INTERNAL MEDICINE

## 2024-04-16 NOTE — TELEPHONE ENCOUNTER
General Call      Reason for Call:  pre op    What are your questions or concerns:  patients pre op is incomplete. Surgery is tomorrow 4-17. It is missing the physical exam so this will need to be addended.     Date of last appointment with provider:     [facility] [place] [phone]

## 2024-04-16 NOTE — PROGRESS NOTES
4/16/2024     E-Consult has been accepted.    Interprofessional consultation requested by:  Vijaya Paulino APRN CNP      Clinical Question/Purpose: MY CLINICAL QUESTION IS: Trivial increase in INR. Do you recommend any follow-up? Patient initially had INR checked by a specialist doing some sort of procedure as part of preprocedure labs. I did a preop and rechecked it, along with aptt and LFTs. Pt has no liver issues, history of excessive bleeding, etc. Do you recommend I continue investigating the cause, or is it possible this is just normal for this pt. She has no particular concerns for upcoming procedures, etc, but just would like to know if she needs to be concerned about this for the future.    Patient assessment and information reviewed:   Chart and labs reviewed.      Healthy 53-year-old woman with no bleeding history.  Her INR was checked prior to a thyroid biopsy in November 2023 and was found to be slightly elevated at 1.18 (normal range 0.85-1.15).  It appears that this was not done in response to any bleeding history, but rather as a routine screening test.    Repeat INR obtained on 3/26/2024 was similarly mildly elevated at 1.17.  PTT is normal at 27.    Recommendations:   The mildly prolonged INR with a normal PTT may be due to mild factor VII deficiency.  Another possibility is mild deficiency of one of the final common pathway factors (factors X, V, or II).  While deficiency of one of the final common pathway factors that should cause prolongation of both the INR and PTT, we occasionally see prolongation of the INR only due to varying sensitivities of the laboratory reagents used to perform these tests.    Would start by obtaining a factor VII level.  If that is normal, then the additional factor levels above should be checked.    If she does have a clotting factor deficiency, it is most likely going to be very mild, and not of hemostatic significance under most circumstances,  particularly given her lack of bleeding history including with previous surgical hemostatic challenges.  Nevertheless, it would be important to clarify the cause of her mildly prolonged INR as this issue will likely come up again in the future and could potentially be problematic in terms of delaying surgeries pending further workup, etc.        The recommendations provided in this E-Consult are based on a review of clinical data pertinent to the clinical question presented, without a review of the patient's complete medical record or, the benefit of a comprehensive in-person or virtual patient evaluation. This consultation should not replace the clinical judgement and evaluation of the provider ordering this E-Consult. Any new clinical issues, or changes in patient status since the filing of this E-Consult will need to be taken into account when assessing these recommendations. Please contact me if you have further questions.    My total time spent reviewing clinical information and formulating assessment was 10 minutes.          Edison Cordova MD  Professor of Medicine  Division of Hematology, Oncology, and Transplantation  Director, [facility]

## 2024-04-17 ENCOUNTER — ANESTHESIA (OUTPATIENT)
Dept: SURGERY | Facility: HOSPITAL | Age: 54
End: 2024-04-17
Payer: COMMERCIAL

## 2024-04-17 ENCOUNTER — HOSPITAL ENCOUNTER (OUTPATIENT)
Facility: HOSPITAL | Age: 54
Discharge: HOME | End: 2024-04-17
Attending: OBSTETRICS & GYNECOLOGY | Admitting: OBSTETRICS & GYNECOLOGY
Payer: COMMERCIAL

## 2024-04-17 VITALS
DIASTOLIC BLOOD PRESSURE: 79 MMHG | BODY MASS INDEX: 20.73 KG/M2 | TEMPERATURE: 97.8 F | SYSTOLIC BLOOD PRESSURE: 115 MMHG | HEART RATE: 62 BPM | HEIGHT: 66 IN | OXYGEN SATURATION: 95 % | RESPIRATION RATE: 16 BRPM | WEIGHT: 129 LBS

## 2024-04-17 DIAGNOSIS — Z90.710 S/P HYSTERECTOMY: Primary | ICD-10-CM

## 2024-04-17 LAB
ABO/RH(D): NORMAL
ANTIBODY SCREEN: NEGATIVE
HCG INTACT+B SERPL-ACNC: 3 MIU/ML
HGB BLD-MCNC: 13.3 G/DL (ref 11.7–15.7)
SPECIMEN EXPIRATION DATE: NORMAL

## 2024-04-17 PROCEDURE — 250N000011 HC RX IP 250 OP 636

## 2024-04-17 PROCEDURE — 258N000003 HC RX IP 258 OP 636

## 2024-04-17 PROCEDURE — 710N000009 HC RECOVERY PHASE 1, LEVEL 1, PER MIN: Performed by: OBSTETRICS & GYNECOLOGY

## 2024-04-17 PROCEDURE — 250N000009 HC RX 250

## 2024-04-17 PROCEDURE — 258N000003 HC RX IP 258 OP 636: Performed by: OBSTETRICS & GYNECOLOGY

## 2024-04-17 PROCEDURE — 258N000003 HC RX IP 258 OP 636: Performed by: ANESTHESIOLOGY

## 2024-04-17 PROCEDURE — 250N000011 HC RX IP 250 OP 636: Performed by: OBSTETRICS & GYNECOLOGY

## 2024-04-17 PROCEDURE — 88307 TISSUE EXAM BY PATHOLOGIST: CPT | Mod: TC | Performed by: OBSTETRICS & GYNECOLOGY

## 2024-04-17 PROCEDURE — 250N000009 HC RX 250: Performed by: OBSTETRICS & GYNECOLOGY

## 2024-04-17 PROCEDURE — 250N000011 HC RX IP 250 OP 636: Performed by: ANESTHESIOLOGY

## 2024-04-17 PROCEDURE — 710N000012 HC RECOVERY PHASE 2, PER MINUTE: Performed by: OBSTETRICS & GYNECOLOGY

## 2024-04-17 PROCEDURE — 36415 COLL VENOUS BLD VENIPUNCTURE: CPT | Performed by: OBSTETRICS & GYNECOLOGY

## 2024-04-17 PROCEDURE — 86900 BLOOD TYPING SEROLOGIC ABO: CPT | Performed by: OBSTETRICS & GYNECOLOGY

## 2024-04-17 PROCEDURE — 370N000017 HC ANESTHESIA TECHNICAL FEE, PER MIN: Performed by: OBSTETRICS & GYNECOLOGY

## 2024-04-17 PROCEDURE — 999N000141 HC STATISTIC PRE-PROCEDURE NURSING ASSESSMENT: Performed by: OBSTETRICS & GYNECOLOGY

## 2024-04-17 PROCEDURE — 250N000013 HC RX MED GY IP 250 OP 250 PS 637: Performed by: OBSTETRICS & GYNECOLOGY

## 2024-04-17 PROCEDURE — 84702 CHORIONIC GONADOTROPIN TEST: CPT | Performed by: OBSTETRICS & GYNECOLOGY

## 2024-04-17 PROCEDURE — 360N000080 HC SURGERY LEVEL 7, PER MIN: Performed by: OBSTETRICS & GYNECOLOGY

## 2024-04-17 PROCEDURE — 272N000001 HC OR GENERAL SUPPLY STERILE: Performed by: OBSTETRICS & GYNECOLOGY

## 2024-04-17 PROCEDURE — 250N000025 HC SEVOFLURANE, PER MIN: Performed by: OBSTETRICS & GYNECOLOGY

## 2024-04-17 PROCEDURE — 85018 HEMOGLOBIN: CPT | Performed by: OBSTETRICS & GYNECOLOGY

## 2024-04-17 RX ORDER — ONDANSETRON 4 MG/1
4 TABLET, ORALLY DISINTEGRATING ORAL EVERY 30 MIN PRN
Status: DISCONTINUED | OUTPATIENT
Start: 2024-04-17 | End: 2024-04-17 | Stop reason: HOSPADM

## 2024-04-17 RX ORDER — LIDOCAINE 40 MG/G
CREAM TOPICAL
Status: DISCONTINUED | OUTPATIENT
Start: 2024-04-17 | End: 2024-04-17 | Stop reason: HOSPADM

## 2024-04-17 RX ORDER — PROPOFOL 10 MG/ML
INJECTION, EMULSION INTRAVENOUS CONTINUOUS PRN
Status: DISCONTINUED | OUTPATIENT
Start: 2024-04-17 | End: 2024-04-17

## 2024-04-17 RX ORDER — ACETAMINOPHEN 325 MG/1
975 TABLET ORAL ONCE
Status: COMPLETED | OUTPATIENT
Start: 2024-04-17 | End: 2024-04-17

## 2024-04-17 RX ORDER — HYDROMORPHONE HYDROCHLORIDE 1 MG/ML
0.2 INJECTION, SOLUTION INTRAMUSCULAR; INTRAVENOUS; SUBCUTANEOUS EVERY 5 MIN PRN
Status: DISCONTINUED | OUTPATIENT
Start: 2024-04-17 | End: 2024-04-17 | Stop reason: HOSPADM

## 2024-04-17 RX ORDER — PROPOFOL 10 MG/ML
INJECTION, EMULSION INTRAVENOUS PRN
Status: DISCONTINUED | OUTPATIENT
Start: 2024-04-17 | End: 2024-04-17

## 2024-04-17 RX ORDER — DIPHENHYDRAMINE HYDROCHLORIDE 50 MG/ML
25 INJECTION INTRAMUSCULAR; INTRAVENOUS EVERY 6 HOURS PRN
Status: DISCONTINUED | OUTPATIENT
Start: 2024-04-17 | End: 2024-04-17 | Stop reason: HOSPADM

## 2024-04-17 RX ORDER — OXYCODONE HYDROCHLORIDE 5 MG/1
5-10 TABLET ORAL EVERY 4 HOURS PRN
Qty: 12 TABLET | Refills: 0 | Status: SHIPPED | OUTPATIENT
Start: 2024-04-17 | End: 2024-04-22

## 2024-04-17 RX ORDER — HALOPERIDOL 5 MG/ML
1 INJECTION INTRAMUSCULAR
Status: DISCONTINUED | OUTPATIENT
Start: 2024-04-17 | End: 2024-04-17 | Stop reason: HOSPADM

## 2024-04-17 RX ORDER — NALOXONE HYDROCHLORIDE 0.4 MG/ML
0.1 INJECTION, SOLUTION INTRAMUSCULAR; INTRAVENOUS; SUBCUTANEOUS
Status: CANCELLED | OUTPATIENT
Start: 2024-04-17

## 2024-04-17 RX ORDER — NALOXONE HYDROCHLORIDE 0.4 MG/ML
0.1 INJECTION, SOLUTION INTRAMUSCULAR; INTRAVENOUS; SUBCUTANEOUS
Status: DISCONTINUED | OUTPATIENT
Start: 2024-04-17 | End: 2024-04-17 | Stop reason: HOSPADM

## 2024-04-17 RX ORDER — OXYCODONE HYDROCHLORIDE 5 MG/1
5 TABLET ORAL
Status: CANCELLED | OUTPATIENT
Start: 2024-04-17

## 2024-04-17 RX ORDER — DEXAMETHASONE SODIUM PHOSPHATE 10 MG/ML
INJECTION, SOLUTION INTRAMUSCULAR; INTRAVENOUS PRN
Status: DISCONTINUED | OUTPATIENT
Start: 2024-04-17 | End: 2024-04-17

## 2024-04-17 RX ORDER — FENTANYL CITRATE 50 UG/ML
25 INJECTION, SOLUTION INTRAMUSCULAR; INTRAVENOUS EVERY 5 MIN PRN
Status: DISCONTINUED | OUTPATIENT
Start: 2024-04-17 | End: 2024-04-17 | Stop reason: HOSPADM

## 2024-04-17 RX ORDER — HYDROMORPHONE HYDROCHLORIDE 1 MG/ML
0.4 INJECTION, SOLUTION INTRAMUSCULAR; INTRAVENOUS; SUBCUTANEOUS EVERY 5 MIN PRN
Status: DISCONTINUED | OUTPATIENT
Start: 2024-04-17 | End: 2024-04-17 | Stop reason: HOSPADM

## 2024-04-17 RX ORDER — PHENAZOPYRIDINE HYDROCHLORIDE 100 MG/1
200 TABLET, FILM COATED ORAL ONCE
Status: COMPLETED | OUTPATIENT
Start: 2024-04-17 | End: 2024-04-17

## 2024-04-17 RX ORDER — SODIUM CHLORIDE, SODIUM LACTATE, POTASSIUM CHLORIDE, AND CALCIUM CHLORIDE .6; .31; .03; .02 G/100ML; G/100ML; G/100ML; G/100ML
IRRIGANT IRRIGATION PRN
Status: DISCONTINUED | OUTPATIENT
Start: 2024-04-17 | End: 2024-04-17 | Stop reason: HOSPADM

## 2024-04-17 RX ORDER — ONDANSETRON 4 MG/1
4 TABLET, ORALLY DISINTEGRATING ORAL EVERY 30 MIN PRN
Status: CANCELLED | OUTPATIENT
Start: 2024-04-17

## 2024-04-17 RX ORDER — LIDOCAINE HYDROCHLORIDE 10 MG/ML
INJECTION, SOLUTION INFILTRATION; PERINEURAL PRN
Status: DISCONTINUED | OUTPATIENT
Start: 2024-04-17 | End: 2024-04-17

## 2024-04-17 RX ORDER — SODIUM CHLORIDE, SODIUM LACTATE, POTASSIUM CHLORIDE, CALCIUM CHLORIDE 600; 310; 30; 20 MG/100ML; MG/100ML; MG/100ML; MG/100ML
INJECTION, SOLUTION INTRAVENOUS CONTINUOUS
Status: DISCONTINUED | OUTPATIENT
Start: 2024-04-17 | End: 2024-04-17 | Stop reason: HOSPADM

## 2024-04-17 RX ORDER — OXYCODONE HYDROCHLORIDE 5 MG/1
10 TABLET ORAL
Status: CANCELLED | OUTPATIENT
Start: 2024-04-17

## 2024-04-17 RX ORDER — ONDANSETRON 2 MG/ML
4 INJECTION INTRAMUSCULAR; INTRAVENOUS EVERY 30 MIN PRN
Status: CANCELLED | OUTPATIENT
Start: 2024-04-17

## 2024-04-17 RX ORDER — FENTANYL CITRATE 50 UG/ML
50 INJECTION, SOLUTION INTRAMUSCULAR; INTRAVENOUS EVERY 5 MIN PRN
Status: DISCONTINUED | OUTPATIENT
Start: 2024-04-17 | End: 2024-04-17 | Stop reason: HOSPADM

## 2024-04-17 RX ORDER — DIAZEPAM 10 MG/2ML
2.5 INJECTION, SOLUTION INTRAMUSCULAR; INTRAVENOUS
Status: DISCONTINUED | OUTPATIENT
Start: 2024-04-17 | End: 2024-04-17 | Stop reason: HOSPADM

## 2024-04-17 RX ORDER — BUPIVACAINE HYDROCHLORIDE 2.5 MG/ML
INJECTION, SOLUTION INFILTRATION; PERINEURAL PRN
Status: DISCONTINUED | OUTPATIENT
Start: 2024-04-17 | End: 2024-04-17 | Stop reason: HOSPADM

## 2024-04-17 RX ORDER — ACETAMINOPHEN 325 MG/1
975 TABLET ORAL ONCE
Status: CANCELLED | OUTPATIENT
Start: 2024-04-17 | End: 2024-04-17

## 2024-04-17 RX ORDER — ONDANSETRON 2 MG/ML
4 INJECTION INTRAMUSCULAR; INTRAVENOUS EVERY 30 MIN PRN
Status: DISCONTINUED | OUTPATIENT
Start: 2024-04-17 | End: 2024-04-17 | Stop reason: HOSPADM

## 2024-04-17 RX ORDER — CEFAZOLIN SODIUM/WATER 2 G/20 ML
2 SYRINGE (ML) INTRAVENOUS
Status: COMPLETED | OUTPATIENT
Start: 2024-04-17 | End: 2024-04-17

## 2024-04-17 RX ORDER — DIPHENHYDRAMINE HCL 25 MG
25 CAPSULE ORAL EVERY 6 HOURS PRN
Status: DISCONTINUED | OUTPATIENT
Start: 2024-04-17 | End: 2024-04-17 | Stop reason: HOSPADM

## 2024-04-17 RX ORDER — MAGNESIUM SULFATE 4 G/50ML
4 INJECTION INTRAVENOUS ONCE
Status: COMPLETED | OUTPATIENT
Start: 2024-04-17 | End: 2024-04-17

## 2024-04-17 RX ORDER — KETOROLAC TROMETHAMINE 30 MG/ML
INJECTION, SOLUTION INTRAMUSCULAR; INTRAVENOUS PRN
Status: DISCONTINUED | OUTPATIENT
Start: 2024-04-17 | End: 2024-04-17

## 2024-04-17 RX ORDER — FENTANYL CITRATE 50 UG/ML
INJECTION, SOLUTION INTRAMUSCULAR; INTRAVENOUS PRN
Status: DISCONTINUED | OUTPATIENT
Start: 2024-04-17 | End: 2024-04-17

## 2024-04-17 RX ORDER — ONDANSETRON 2 MG/ML
INJECTION INTRAMUSCULAR; INTRAVENOUS PRN
Status: DISCONTINUED | OUTPATIENT
Start: 2024-04-17 | End: 2024-04-17

## 2024-04-17 RX ORDER — CEFAZOLIN SODIUM/WATER 2 G/20 ML
2 SYRINGE (ML) INTRAVENOUS SEE ADMIN INSTRUCTIONS
Status: DISCONTINUED | OUTPATIENT
Start: 2024-04-17 | End: 2024-04-17 | Stop reason: HOSPADM

## 2024-04-17 RX ORDER — IBUPROFEN 200 MG
800 TABLET ORAL ONCE
Status: CANCELLED | OUTPATIENT
Start: 2024-04-17 | End: 2024-04-17

## 2024-04-17 RX ORDER — KETAMINE HYDROCHLORIDE 10 MG/ML
INJECTION INTRAMUSCULAR; INTRAVENOUS PRN
Status: DISCONTINUED | OUTPATIENT
Start: 2024-04-17 | End: 2024-04-17

## 2024-04-17 RX ADMIN — PHENYLEPHRINE HYDROCHLORIDE 100 MCG: 10 INJECTION INTRAVENOUS at 08:28

## 2024-04-17 RX ADMIN — PHENYLEPHRINE HYDROCHLORIDE 100 MCG: 10 INJECTION INTRAVENOUS at 07:45

## 2024-04-17 RX ADMIN — ROCURONIUM BROMIDE 25 MG: 50 INJECTION, SOLUTION INTRAVENOUS at 08:27

## 2024-04-17 RX ADMIN — PHENYLEPHRINE HYDROCHLORIDE 100 MCG: 10 INJECTION INTRAVENOUS at 07:43

## 2024-04-17 RX ADMIN — ACETAMINOPHEN 975 MG: 325 TABLET ORAL at 06:55

## 2024-04-17 RX ADMIN — PHENAZOPYRIDINE 200 MG: 100 TABLET ORAL at 06:56

## 2024-04-17 RX ADMIN — ROCURONIUM BROMIDE 20 MG: 50 INJECTION, SOLUTION INTRAVENOUS at 07:46

## 2024-04-17 RX ADMIN — ROCURONIUM BROMIDE 20 MG: 50 INJECTION, SOLUTION INTRAVENOUS at 08:59

## 2024-04-17 RX ADMIN — SUGAMMADEX 200 MG: 100 INJECTION, SOLUTION INTRAVENOUS at 09:50

## 2024-04-17 RX ADMIN — KETAMINE HYDROCHLORIDE 10 MG: 10 INJECTION INTRAMUSCULAR; INTRAVENOUS at 08:59

## 2024-04-17 RX ADMIN — FENTANYL CITRATE 50 MCG: 50 INJECTION INTRAMUSCULAR; INTRAVENOUS at 07:53

## 2024-04-17 RX ADMIN — LIDOCAINE HYDROCHLORIDE 3 ML: 10 INJECTION, SOLUTION INFILTRATION; PERINEURAL at 07:32

## 2024-04-17 RX ADMIN — MAGNESIUM SULFATE HEPTAHYDRATE 4 G: 80 INJECTION, SOLUTION INTRAVENOUS at 07:06

## 2024-04-17 RX ADMIN — PROPOFOL 100 MG: 10 INJECTION, EMULSION INTRAVENOUS at 07:33

## 2024-04-17 RX ADMIN — PHENYLEPHRINE HYDROCHLORIDE 50 MCG: 10 INJECTION INTRAVENOUS at 08:14

## 2024-04-17 RX ADMIN — DEXAMETHASONE SODIUM PHOSPHATE 10 MG: 10 INJECTION, SOLUTION INTRAMUSCULAR; INTRAVENOUS at 07:48

## 2024-04-17 RX ADMIN — ONDANSETRON 4 MG: 2 INJECTION INTRAMUSCULAR; INTRAVENOUS at 09:45

## 2024-04-17 RX ADMIN — PROPOFOL 80 MG: 10 INJECTION, EMULSION INTRAVENOUS at 07:34

## 2024-04-17 RX ADMIN — KETAMINE HYDROCHLORIDE 30 MG: 10 INJECTION INTRAMUSCULAR; INTRAVENOUS at 07:53

## 2024-04-17 RX ADMIN — PHENYLEPHRINE HYDROCHLORIDE 50 MCG: 10 INJECTION INTRAVENOUS at 08:17

## 2024-04-17 RX ADMIN — PROPOFOL 50 MCG/KG/MIN: 10 INJECTION, EMULSION INTRAVENOUS at 07:34

## 2024-04-17 RX ADMIN — ROCURONIUM BROMIDE 30 MG: 50 INJECTION, SOLUTION INTRAVENOUS at 07:33

## 2024-04-17 RX ADMIN — MIDAZOLAM 2 MG: 1 INJECTION INTRAMUSCULAR; INTRAVENOUS at 07:23

## 2024-04-17 RX ADMIN — KETOROLAC TROMETHAMINE 15 MG: 30 INJECTION, SOLUTION INTRAMUSCULAR at 09:43

## 2024-04-17 RX ADMIN — SODIUM CHLORIDE, POTASSIUM CHLORIDE, SODIUM LACTATE AND CALCIUM CHLORIDE: 600; 310; 30; 20 INJECTION, SOLUTION INTRAVENOUS at 08:31

## 2024-04-17 RX ADMIN — HYDROMORPHONE HYDROCHLORIDE 0.5 MG: 1 INJECTION, SOLUTION INTRAMUSCULAR; INTRAVENOUS; SUBCUTANEOUS at 08:50

## 2024-04-17 RX ADMIN — KETAMINE HYDROCHLORIDE 10 MG: 10 INJECTION INTRAMUSCULAR; INTRAVENOUS at 08:36

## 2024-04-17 RX ADMIN — SODIUM CHLORIDE, POTASSIUM CHLORIDE, SODIUM LACTATE AND CALCIUM CHLORIDE: 600; 310; 30; 20 INJECTION, SOLUTION INTRAVENOUS at 07:04

## 2024-04-17 RX ADMIN — ROCURONIUM BROMIDE 25 MG: 50 INJECTION, SOLUTION INTRAVENOUS at 07:53

## 2024-04-17 RX ADMIN — FENTANYL CITRATE 50 MCG: 50 INJECTION INTRAMUSCULAR; INTRAVENOUS at 07:32

## 2024-04-17 RX ADMIN — Medication 2 G: at 07:23

## 2024-04-17 ASSESSMENT — ACTIVITIES OF DAILY LIVING (ADL)
ADLS_ACUITY_SCORE: 20
ADLS_ACUITY_SCORE: 33
ADLS_ACUITY_SCORE: 20
ADLS_ACUITY_SCORE: 20

## 2024-04-17 NOTE — ANESTHESIA PREPROCEDURE EVALUATION
Anesthesia Pre-Procedure Evaluation    Patient: Luz Sharpe   MRN: [id number] : 1970        Procedure : Procedure(s):  ROBOTIC ASSISTED LAPAROSCOPIC HYSTERECTOMY, BILATERAL SALPINGO-OOPHORECTOMY, CYSTOSCOPY          Past Medical History:   Diagnosis Date     Cancer (H)     squamous     History of colonic polyps      Ovarian cyst      PMB (postmenopausal bleeding)       Past Surgical History:   Procedure Laterality Date     IR FINE NEEDLE ASPIRATION W ULTRASOUND  11/10/2023     TONSILLECTOMY        Allergies   Allergen Reactions     Fish Oil Swelling     Fish Oil Unknown     Fish-Derived Products      Finned-fish (salmon, tuna, Cod fish)      Social History     Tobacco Use     Smoking status: Never     Passive exposure: Never     Smokeless tobacco: Never   Substance Use Topics     Alcohol use: Yes     Comment: 3-5 drinks per week      Wt Readings from Last 1 Encounters:   24 58.5 kg (129 lb)        Anesthesia Evaluation   Pt has had prior anesthetic.         ROS/MED HX  ENT/Pulmonary:  - neg pulmonary ROS     Neurologic:  - neg neurologic ROS     Cardiovascular:  - neg cardiovascular ROS     METS/Exercise Tolerance:     Hematologic:  - neg hematologic  ROS     Musculoskeletal:  - neg musculoskeletal ROS     GI/Hepatic:  - neg GI/hepatic ROS     Renal/Genitourinary:  - neg Renal ROS     Endo:  - neg endo ROS     Psychiatric/Substance Use:  - neg psychiatric ROS     Infectious Disease:  - neg infectious disease ROS     Malignancy:   (+) Malignancy,     Other:  - neg other ROS        Physical Exam    Airway  airway exam normal      Mallampati: I   TM distance: > 3 FB   Neck ROM: full   Mouth opening: > 3 cm    Respiratory Devices and Support         Dental  no notable dental history     (+) Modest Abnormalities - crowns, retainers, 1 or 2 missing teeth      Cardiovascular   cardiovascular exam normal       Rhythm and rate: regular and normal     Pulmonary   pulmonary exam normal        breath sounds  "clear to auscultation       OUTSIDE LABS:  CBC:   Lab Results   Component Value Date    WBC 4.7 11/10/2023    WBC 6.0 09/25/2023    HGB 13.3 04/17/2024    HGB 13.8 04/08/2024    HCT 42.5 11/10/2023    HCT 44.0 09/25/2023     11/10/2023     09/25/2023     BMP:   Lab Results   Component Value Date     11/09/2022    POTASSIUM 3.9 11/09/2022    CHLORIDE 102 11/09/2022    CO2 24 11/09/2022    BUN 16.2 11/09/2022    CR 0.72 11/09/2022    GLC 88 11/09/2022     COAGS:   Lab Results   Component Value Date    PTT 27 04/08/2024    INR 1.17 (H) 03/26/2024     POC: No results found for: \"BGM\", \"HCG\", \"HCGS\"  HEPATIC:   Lab Results   Component Value Date    ALBUMIN 4.7 04/08/2024    PROTTOTAL 7.0 04/08/2024    ALT 22 04/08/2024    AST 29 04/08/2024    ALKPHOS 60 04/08/2024    BILITOTAL 0.8 04/08/2024     OTHER:   Lab Results   Component Value Date    PRSAAD 10.2 (H) 11/09/2022    TSH 1.43 11/09/2022    T4 1.11 11/09/2022       Anesthesia Plan    ASA Status:  2    NPO Status:  NPO Appropriate    Anesthesia Type: General.     - Airway: ETT   Induction: Intravenous, Propofol.   Maintenance: Balanced.        Consents    Anesthesia Plan(s) and associated risks, benefits, and realistic alternatives discussed. Questions answered and patient/representative(s) expressed understanding.     - Discussed:     - Discussed with:  Patient      - Extended Intubation/Ventilatory Support Discussed: Yes.      - Patient is DNR/DNI Status: No     Use of blood products discussed: Yes.     - Discussed with: Patient.     Postoperative Care    Pain management: Multi-modal analgesia.   PONV prophylaxis: Ondansetron (or other 5HT-3), Dexamethasone or Solumedrol, Background Propofol Infusion     Comments:             Luis Alberto Aguilar MD    I have reviewed the pertinent notes and labs in the chart from the past 30 days and (re)examined the patient.  Any updates or changes from those notes are reflected in this note.            # Coagulation " Defect: INR = 1.17 (Ref range: 0.85 - 1.15) and/or PTT = 27 Seconds (Ref range: 22 - 38 Seconds), will monitor for bleeding

## 2024-04-17 NOTE — ANESTHESIA POSTPROCEDURE EVALUATION
Patient: Luz Sharpe    Procedure: Procedure(s):  ROBOTIC ASSISTED LAPAROSCOPIC HYSTERECTOMY, BILATERAL SALPINGO-OOPHORECTOMY, LYSIS OF ADHESIONS, CYSTOSCOPY       Anesthesia Type:  General    Note:  Disposition: Outpatient   Postop Pain Control: Uneventful            Sign Out: Well controlled pain   PONV: No   Neuro/Psych: Uneventful            Sign Out: Acceptable/Baseline neuro status   Airway/Respiratory: Uneventful            Sign Out: Acceptable/Baseline resp. status   CV/Hemodynamics: Uneventful            Sign Out: Acceptable CV status; No obvious hypovolemia; No obvious fluid overload   Other NRE: NONE   DID A NON-ROUTINE EVENT OCCUR? No       Last vitals:  Vitals Value Taken Time   /76 04/17/24 1045   Temp 36.6  C (97.8  F) 04/17/24 1030   Pulse 63 04/17/24 1051   Resp 14 04/17/24 1051   SpO2 95 % 04/17/24 1051   Vitals shown include unfiled device data.    Electronically Signed By: Luis Alberto Aguilar MD  April 17, 2024  11:43 AM

## 2024-04-17 NOTE — H&P
"YES I have reviewed the patient s H&P against current condition and have identified no clinically significant changes in the patient s condition.   YES I have identified significant changes in the patient s medical condition and have discussed with surgeon.     /76 (BP Location: Right arm)   Pulse 68   Temp 98.6  F (37  C) (Oral)   Resp 18   Ht 1.676 m (5' 6\")   Wt 58.5 kg (129 lb)   SpO2 98%   BMI 20.82 kg/m    RRR  CTAB  Plan to continue with surgery as planned    Negin Pink MD   "

## 2024-04-17 NOTE — OP NOTE
OPERATIVE REPORT     Name: Luz Sharpe    MRN: [id number]    CSN: [id number]    Procedure date: 4/17/2024    PRE-OP DIAGNOSIS: postmenopausal bleeding and ovarian cyst    POST-OPERATIVE DIAGNOSIS: endometriosis and endometrioma scar tissue and adhesions    OPERATION: Robotic assisted total laparoscopic hysterectomy, bilateral salpingectomy, cystoscopy     ATTENDING SURGEON: Negin Pink MD    ASSISTANT(S): Circulator: Colleen Pennington RN  Relief Circulator: Jacki Wren RN  Relief Scrub: Lizbet Waters  Scrub Person: Margie Cash; Marlene Salinas  First Assistant: Marlene Colón    ANESTHESIA: General ET      URINE OUTPUT: 200 ML.    ESTIMATED BLOOD LOSS: 25 ML.    SPECIMENS: bilateral tubes ovaries and uterus and cervix    FINDINGS:   LSC - 8cm fibroid uterus, stuck posterior vaginal area and normal adnexa bilaterally, copious adhesive disease and endometriosis with endometrioma on right ovary,   Cystoscopy - intact bladder, bilateral ureteral orifices seen effluxing urine    DESCRIPTION OF PROCEDURE:  The patient was informed of the risks, benefits, and alternatives of the above-mentioned procedures and the consent was signed and witnessed. The patient was taken to the operating room and placed in the supine position. General endotracheal anesthesia was achieved without difficulty. The patient was given preoperative prophylactic intravenous antibiotics. The patient was then placed in the dorsal lithotomy position using Parveen stirrups. All pressure points were avoided or padded appropriately and a Kristian Hugger  was placed. Both arms were tucked in anatomical position at the patient s side. The patient was then examined, prepped and draped in the usual sterile fashion. A castelan catheter was introduced into the bladder. A speculum was placed into the vagina and the anterior lip of the cervix grasped with a single tooth tenaculum. The uterus was sounded to 7 cm. A large V care uterine  manipulator was introduced into the uterus. The tenaculum was removed and the puncture sites hemostatic. The speculum was removed from the vagina.     Attention was turned to the abdomen. A 5mm incision was made 2-3cm above the umbilicus, and the abdomin was entered under direct visualization. the peritoneal cavity with an opening pressure of 4 mmHg.  The abdomen was allowed to insufflate to a pressure of 15mmHg. The camera port was then entered into the peritoneal cavity with entry confirmed with exsufflation of air. The robotic laparoscope was then entered into the abdomen and a survey was undertaken with the findings listed above. The patient was placed into trendelenburg and the robotic trocars were placed under direct visualization, one on the right and one on the left. A 5mm assist port was placed in the right  lower quadrant. The bowel was swept out of the pelvis and the robot was then docked to the left side of the patient.      At this point attention was turned to performance of the hysterectomy.      The uterus was noted to be adhered to the posterior cul-de-sac and the a manipulator had perforated through the anterior fundus. Adhesions were taken down posteriorly with gentle traction and pealing, bowel was better retracted after this.  The ovaries were adhesions to the pelvic wall and these were taken down and released.    Then the L round ligament was grasped using the PreCise bipolar forceps.  It was cauterized and transected using the monopolar scissors.    The ureter was noted to be coursing in the medial aspect of this dissection only  seen on the right side, unable to visualize on left due to bowel adhesions.  A window was made underneath the IP ligament on this site with care taken to protect the ureter.  The IP ligament was then cauterized and transected using bipolar forceps and monopolar vivek.  Adnexectomy carried out to the uterine cornua.  At the posterior aspect the broad ligament was  skeletonized down to the  uterosacral ligament.  The vesicouterine peritoneum was then incised down to the level of the anterior cervix.  The bladder flap was then   developed on this side by using monopolar cautery and blunt dissection. The uterine vessels on this side were then further skeletonized.  They were then cauterized and transected using bipolar forceps and monopolar vivek.  The cardinal ligament was then dissected out laterally to the cup on the side. Attention was then turned to the R side of the hysterectomy, where the same procedure was performed. Finally, a colpotomy was made by incising circumferentially along the colpotomy cup using monopolar.      The manipulator was removed from the vagina along with all the specimins and a pneumoccluder was placed into the vagina. Detached specimen was placed into the left upper quadrant until tissue extraction. The vaginal cuff was then closed with 0 V-Lock suture in a running fashion with good approximation of tissue, ensuring a 1 cm margin.  Pelvis was irrigated and good hemostasis was confirmed. Surgicel was placed a the cuff. The robot was undocked.    All ports where then removed under direct vision.    We then performed cystoscopy. Romo catheter was removed and a 70-degree laparoscope was introduced into the bladder. An intact bladder was seen and bilateral ureteral jets was demonstrated      Skin incisions were closed with 4-0 Monocryl and 20 cc of 0.5% Marcaine was infiltrated. Patient was taken to the recovery room in a stable condition.    Negin Pink MD

## 2024-04-17 NOTE — ANESTHESIA PROCEDURE NOTES
Airway       Patient location during procedure: OR       Procedure Start/Stop Times: 4/17/2024 7:36 AM  Staff -        CRNA: Usman Stark APRN CRNA       Performed By: CRNA  Consent for Airway        Urgency: elective  Indications and Patient Condition       Indications for airway management: morenita-procedural       Induction type:intravenous       Mask difficulty assessment: 1 - vent by mask    Final Airway Details       Final airway type: endotracheal airway       Successful airway: ETT - single and Oral  Endotracheal Airway Details        ETT size (mm): 7.0       Cuffed: yes       Successful intubation technique: direct laryngoscopy       DL Blade Type: MAC 3       Grade View of Cords: 1       Adjucts: stylet       Position: Right       Measured from: lips       Secured at (cm): 22       Bite block used: None    Post intubation assessment        Placement verified by: capnometry, equal breath sounds and chest rise        Number of attempts at approach: 1       Number of other approaches attempted: 0       Secured with: silk tape       Ease of procedure: easy       Dentition: Intact and Unchanged       Dental guard used and removed. Dental Guard Type: Standard White.    Medication(s) Administered   Medication Administration Time: 4/17/2024 7:36 AM

## 2024-04-17 NOTE — ANESTHESIA CARE TRANSFER NOTE
Patient: Luz Sharpe    Procedure: Procedure(s):  ROBOTIC ASSISTED LAPAROSCOPIC HYSTERECTOMY, BILATERAL SALPINGO-OOPHORECTOMY, LYSIS OF ADHESIONS, CYSTOSCOPY       Diagnosis: Postmenopausal bleeding [N95.0]  Diagnosis Additional Information: No value filed.    Anesthesia Type:   General     Note:    Oropharynx: oropharynx clear of all foreign objects and spontaneously breathing  Level of Consciousness: drowsy  Oxygen Supplementation: face mask  Level of Supplemental Oxygen (L/min / FiO2): 6  Independent Airway: airway patency satisfactory and stable  Dentition: dentition unchanged  Vital Signs Stable: post-procedure vital signs reviewed and stable  Report to RN Given: handoff report given  Patient transferred to: PACU    Handoff Report: Identifed the Patient, Identified the Reponsible Provider, Reviewed the pertinent medical history, Discussed the surgical course, Reviewed Intra-OP anesthesia mangement and issues during anesthesia, Set expectations for post-procedure period and Allowed opportunity for questions and acknowledgement of understanding      Vitals:  Vitals Value Taken Time   /80 04/17/24 1004   Temp 97.8 04/17/24 1004   Pulse 75 04/17/24 1005   Resp 16 04/17/24 1005   SpO2 100 % 04/17/24 1005   Vitals shown include unfiled device data.    Electronically Signed By: JOANNE Lindo CRNA  April 17, 2024  10:07 AM

## 2024-04-18 LAB
PATH REPORT.COMMENTS IMP SPEC: NORMAL
PATH REPORT.COMMENTS IMP SPEC: NORMAL
PATH REPORT.FINAL DX SPEC: NORMAL
PATH REPORT.GROSS SPEC: NORMAL
PATH REPORT.MICROSCOPIC SPEC OTHER STN: NORMAL
PATH REPORT.RELEVANT HX SPEC: NORMAL
PHOTO IMAGE: NORMAL

## 2024-04-18 PROCEDURE — 88307 TISSUE EXAM BY PATHOLOGIST: CPT | Mod: 26 | Performed by: PATHOLOGY

## 2024-04-22 ENCOUNTER — HOSPITAL ENCOUNTER (INPATIENT)
Facility: HOSPITAL | Age: 54
LOS: 1 days | Discharge: HOME | DRG: 856 | End: 2024-04-24
Attending: EMERGENCY MEDICINE | Admitting: SURGERY
Payer: COMMERCIAL

## 2024-04-22 ENCOUNTER — ANESTHESIA EVENT (OUTPATIENT)
Dept: SURGERY | Facility: HOSPITAL | Age: 54
DRG: 856 | End: 2024-04-22
Payer: COMMERCIAL

## 2024-04-22 ENCOUNTER — ANESTHESIA (OUTPATIENT)
Dept: SURGERY | Facility: HOSPITAL | Age: 54
DRG: 856 | End: 2024-04-22
Payer: COMMERCIAL

## 2024-04-22 ENCOUNTER — APPOINTMENT (OUTPATIENT)
Dept: CT IMAGING | Facility: HOSPITAL | Age: 54
DRG: 856 | End: 2024-04-22
Attending: EMERGENCY MEDICINE
Payer: COMMERCIAL

## 2024-04-22 DIAGNOSIS — Z90.710 STATUS POST HYSTERECTOMY: ICD-10-CM

## 2024-04-22 DIAGNOSIS — K66.8 PNEUMOPERITONEUM: Primary | ICD-10-CM

## 2024-04-22 DIAGNOSIS — R10.84 GENERALIZED ABDOMINAL PAIN: ICD-10-CM

## 2024-04-22 DIAGNOSIS — N73.9 PELVIC ABSCESS IN FEMALE: ICD-10-CM

## 2024-04-22 DIAGNOSIS — R19.8 PERFORATED VISCUS: ICD-10-CM

## 2024-04-22 PROBLEM — A41.89 SEPSIS DUE TO OTHER ETIOLOGY (H): Status: ACTIVE | Noted: 2024-04-22

## 2024-04-22 PROBLEM — K63.1 PERFORATED SIGMOID COLON (H): Status: ACTIVE | Noted: 2024-04-22

## 2024-04-22 LAB
ALBUMIN SERPL BCG-MCNC: 3.7 G/DL (ref 3.5–5.2)
ALBUMIN UR-MCNC: 20 MG/DL
ALP SERPL-CCNC: 67 U/L (ref 40–150)
ALT SERPL W P-5'-P-CCNC: 19 U/L (ref 0–50)
ANION GAP SERPL CALCULATED.3IONS-SCNC: 11 MMOL/L (ref 7–15)
APPEARANCE UR: CLEAR
AST SERPL W P-5'-P-CCNC: 22 U/L (ref 0–45)
BASOPHILS # BLD AUTO: 0 10E3/UL (ref 0–0.2)
BASOPHILS NFR BLD AUTO: 0 %
BILIRUB SERPL-MCNC: 0.7 MG/DL
BILIRUB UR QL STRIP: NEGATIVE
BUN SERPL-MCNC: 14.2 MG/DL (ref 6–20)
CALCIUM SERPL-MCNC: 9.3 MG/DL (ref 8.6–10)
CHLORIDE SERPL-SCNC: 96 MMOL/L (ref 98–107)
COLOR UR AUTO: YELLOW
CREAT SERPL-MCNC: 0.66 MG/DL (ref 0.51–0.95)
DEPRECATED HCO3 PLAS-SCNC: 27 MMOL/L (ref 22–29)
EGFRCR SERPLBLD CKD-EPI 2021: >90 ML/MIN/1.73M2
EOSINOPHIL # BLD AUTO: 0.1 10E3/UL (ref 0–0.7)
EOSINOPHIL NFR BLD AUTO: 1 %
ERYTHROCYTE [DISTWIDTH] IN BLOOD BY AUTOMATED COUNT: 12.9 % (ref 10–15)
GLUCOSE SERPL-MCNC: 115 MG/DL (ref 70–99)
GLUCOSE UR STRIP-MCNC: NEGATIVE MG/DL
HCT VFR BLD AUTO: 36.8 % (ref 35–47)
HGB BLD-MCNC: 12.3 G/DL (ref 11.7–15.7)
HGB UR QL STRIP: NEGATIVE
HOLD SPECIMEN: NORMAL
HYALINE CASTS: 2 /LPF
IMM GRANULOCYTES # BLD: 0 10E3/UL
IMM GRANULOCYTES NFR BLD: 0 %
KETONES UR STRIP-MCNC: 40 MG/DL
LACTATE SERPL-SCNC: 1 MMOL/L (ref 0.7–2)
LEUKOCYTE ESTERASE UR QL STRIP: NEGATIVE
LYMPHOCYTES # BLD AUTO: 0.6 10E3/UL (ref 0.8–5.3)
LYMPHOCYTES NFR BLD AUTO: 5 %
MCH RBC QN AUTO: 28.3 PG (ref 26.5–33)
MCHC RBC AUTO-ENTMCNC: 33.4 G/DL (ref 31.5–36.5)
MCV RBC AUTO: 85 FL (ref 78–100)
MONOCYTES # BLD AUTO: 0.9 10E3/UL (ref 0–1.3)
MONOCYTES NFR BLD AUTO: 9 %
MUCOUS THREADS #/AREA URNS LPF: PRESENT /LPF
NEUTROPHILS # BLD AUTO: 9 10E3/UL (ref 1.6–8.3)
NEUTROPHILS NFR BLD AUTO: 85 %
NITRATE UR QL: NEGATIVE
NRBC # BLD AUTO: 0 10E3/UL
NRBC BLD AUTO-RTO: 0 /100
PH UR STRIP: 6.5 [PH] (ref 5–7)
PLATELET # BLD AUTO: 234 10E3/UL (ref 150–450)
POTASSIUM SERPL-SCNC: 3.8 MMOL/L (ref 3.4–5.3)
PROT SERPL-MCNC: 7.1 G/DL (ref 6.4–8.3)
RADIOLOGIST FLAGS: ABNORMAL
RBC # BLD AUTO: 4.35 10E6/UL (ref 3.8–5.2)
RBC URINE: 1 /HPF
SODIUM SERPL-SCNC: 134 MMOL/L (ref 135–145)
SP GR UR STRIP: 1.02 (ref 1–1.03)
UROBILINOGEN UR STRIP-MCNC: <2 MG/DL
WBC # BLD AUTO: 10.6 10E3/UL (ref 4–11)
WBC URINE: 1 /HPF

## 2024-04-22 PROCEDURE — 96375 TX/PRO/DX INJ NEW DRUG ADDON: CPT

## 2024-04-22 PROCEDURE — 250N000011 HC RX IP 250 OP 636: Performed by: EMERGENCY MEDICINE

## 2024-04-22 PROCEDURE — 81001 URINALYSIS AUTO W/SCOPE: CPT | Performed by: EMERGENCY MEDICINE

## 2024-04-22 PROCEDURE — 96361 HYDRATE IV INFUSION ADD-ON: CPT

## 2024-04-22 PROCEDURE — 250N000013 HC RX MED GY IP 250 OP 250 PS 637: Performed by: STUDENT IN AN ORGANIZED HEALTH CARE EDUCATION/TRAINING PROGRAM

## 2024-04-22 PROCEDURE — 85025 COMPLETE CBC W/AUTO DIFF WBC: CPT | Performed by: EMERGENCY MEDICINE

## 2024-04-22 PROCEDURE — 74177 CT ABD & PELVIS W/CONTRAST: CPT

## 2024-04-22 PROCEDURE — 80053 COMPREHEN METABOLIC PANEL: CPT | Performed by: EMERGENCY MEDICINE

## 2024-04-22 PROCEDURE — 83605 ASSAY OF LACTIC ACID: CPT | Performed by: EMERGENCY MEDICINE

## 2024-04-22 PROCEDURE — 96365 THER/PROPH/DIAG IV INF INIT: CPT

## 2024-04-22 PROCEDURE — 258N000003 HC RX IP 258 OP 636: Performed by: EMERGENCY MEDICINE

## 2024-04-22 PROCEDURE — 36415 COLL VENOUS BLD VENIPUNCTURE: CPT | Performed by: EMERGENCY MEDICINE

## 2024-04-22 PROCEDURE — 99291 CRITICAL CARE FIRST HOUR: CPT | Mod: 25

## 2024-04-22 RX ORDER — ONDANSETRON 2 MG/ML
4 INJECTION INTRAMUSCULAR; INTRAVENOUS ONCE
Status: COMPLETED | OUTPATIENT
Start: 2024-04-22 | End: 2024-04-22

## 2024-04-22 RX ORDER — IOPAMIDOL 755 MG/ML
64 INJECTION, SOLUTION INTRAVASCULAR ONCE
Status: COMPLETED | OUTPATIENT
Start: 2024-04-22 | End: 2024-04-22

## 2024-04-22 RX ORDER — ACETAMINOPHEN 500 MG
1000 TABLET ORAL EVERY 8 HOURS PRN
COMMUNITY
End: 2024-07-09

## 2024-04-22 RX ORDER — ACETAMINOPHEN 325 MG/1
650 TABLET ORAL ONCE
Status: COMPLETED | OUTPATIENT
Start: 2024-04-22 | End: 2024-04-22

## 2024-04-22 RX ORDER — ESTRADIOL 0.1 MG/G
CREAM VAGINAL SEE ADMIN INSTRUCTIONS
Status: ON HOLD | COMMUNITY
Start: 2024-04-08 | End: 2024-04-24

## 2024-04-22 RX ORDER — CITALOPRAM HYDROBROMIDE 20 MG/1
1 TABLET ORAL EVERY MORNING
COMMUNITY
Start: 2024-03-25

## 2024-04-22 RX ORDER — PIPERACILLIN SODIUM, TAZOBACTAM SODIUM 3; .375 G/15ML; G/15ML
3.38 INJECTION, POWDER, LYOPHILIZED, FOR SOLUTION INTRAVENOUS EVERY 8 HOURS
Status: DISCONTINUED | OUTPATIENT
Start: 2024-04-23 | End: 2024-04-24 | Stop reason: HOSPADM

## 2024-04-22 RX ORDER — TRAZODONE HYDROCHLORIDE 50 MG/1
50 TABLET, FILM COATED ORAL
COMMUNITY
Start: 2024-04-10

## 2024-04-22 RX ORDER — PIPERACILLIN SODIUM, TAZOBACTAM SODIUM 3; .375 G/15ML; G/15ML
3.38 INJECTION, POWDER, LYOPHILIZED, FOR SOLUTION INTRAVENOUS ONCE
Status: COMPLETED | OUTPATIENT
Start: 2024-04-22 | End: 2024-04-22

## 2024-04-22 RX ADMIN — HYDROMORPHONE HYDROCHLORIDE 0.5 MG: 1 INJECTION, SOLUTION INTRAMUSCULAR; INTRAVENOUS; SUBCUTANEOUS at 21:09

## 2024-04-22 RX ADMIN — ACETAMINOPHEN 650 MG: 325 TABLET ORAL at 19:07

## 2024-04-22 RX ADMIN — IOPAMIDOL 64 ML: 755 INJECTION, SOLUTION INTRAVENOUS at 21:37

## 2024-04-22 RX ADMIN — ONDANSETRON 4 MG: 2 INJECTION INTRAMUSCULAR; INTRAVENOUS at 19:50

## 2024-04-22 RX ADMIN — SODIUM CHLORIDE 1000 ML: 9 INJECTION, SOLUTION INTRAVENOUS at 19:37

## 2024-04-22 RX ADMIN — PIPERACILLIN AND TAZOBACTAM 3.38 G: 3; .375 INJECTION, POWDER, FOR SOLUTION INTRAVENOUS at 22:16

## 2024-04-22 ASSESSMENT — COLUMBIA-SUICIDE SEVERITY RATING SCALE - C-SSRS
2. HAVE YOU ACTUALLY HAD ANY THOUGHTS OF KILLING YOURSELF IN THE PAST MONTH?: NO
1. IN THE PAST MONTH, HAVE YOU WISHED YOU WERE DEAD OR WISHED YOU COULD GO TO SLEEP AND NOT WAKE UP?: NO
6. HAVE YOU EVER DONE ANYTHING, STARTED TO DO ANYTHING, OR PREPARED TO DO ANYTHING TO END YOUR LIFE?: NO

## 2024-04-22 ASSESSMENT — ACTIVITIES OF DAILY LIVING (ADL)
ADLS_ACUITY_SCORE: 35
ADLS_ACUITY_SCORE: 33
ADLS_ACUITY_SCORE: 35

## 2024-04-22 NOTE — ED TRIAGE NOTES
Patient had a hysterectomy on Wednesday has felt poorly since that time patient states she has back pain, a lot of fatigue, nausea and diarrhea. Today patient noted a fever as well as chills.

## 2024-04-23 PROBLEM — Z90.710 STATUS POST HYSTERECTOMY: Status: ACTIVE | Noted: 2024-04-23

## 2024-04-23 PROBLEM — R10.84 GENERALIZED ABDOMINAL PAIN: Status: ACTIVE | Noted: 2024-04-23

## 2024-04-23 PROBLEM — K66.8 PNEUMOPERITONEUM: Status: ACTIVE | Noted: 2024-04-23

## 2024-04-23 PROBLEM — R19.8 PERFORATED VISCUS: Status: ACTIVE | Noted: 2024-04-23

## 2024-04-23 PROCEDURE — 258N000003 HC RX IP 258 OP 636: Performed by: SURGERY

## 2024-04-23 PROCEDURE — 250N000011 HC RX IP 250 OP 636: Performed by: NURSE ANESTHETIST, CERTIFIED REGISTERED

## 2024-04-23 PROCEDURE — 250N000011 HC RX IP 250 OP 636: Performed by: SURGERY

## 2024-04-23 PROCEDURE — 250N000013 HC RX MED GY IP 250 OP 250 PS 637: Performed by: SURGERY

## 2024-04-23 PROCEDURE — 999N000141 HC STATISTIC PRE-PROCEDURE NURSING ASSESSMENT: Performed by: SURGERY

## 2024-04-23 PROCEDURE — 250N000009 HC RX 250: Performed by: NURSE ANESTHETIST, CERTIFIED REGISTERED

## 2024-04-23 PROCEDURE — 49322 LAPAROSCOPY ASPIRATION: CPT | Performed by: SURGERY

## 2024-04-23 PROCEDURE — 258N000003 HC RX IP 258 OP 636: Performed by: ANESTHESIOLOGY

## 2024-04-23 PROCEDURE — 250N000011 HC RX IP 250 OP 636: Performed by: ANESTHESIOLOGY

## 2024-04-23 PROCEDURE — 0W9G4ZZ DRAINAGE OF PERITONEAL CAVITY, PERCUTANEOUS ENDOSCOPIC APPROACH: ICD-10-PCS | Performed by: SURGERY

## 2024-04-23 PROCEDURE — 370N000017 HC ANESTHESIA TECHNICAL FEE, PER MIN: Performed by: SURGERY

## 2024-04-23 PROCEDURE — 272N000001 HC OR GENERAL SUPPLY STERILE: Performed by: SURGERY

## 2024-04-23 PROCEDURE — 210N000001 HC R&B IMCU HEART CARE

## 2024-04-23 PROCEDURE — 99024 POSTOP FOLLOW-UP VISIT: CPT | Performed by: PHYSICIAN ASSISTANT

## 2024-04-23 PROCEDURE — 710N000009 HC RECOVERY PHASE 1, LEVEL 1, PER MIN: Performed by: SURGERY

## 2024-04-23 PROCEDURE — 250N000011 HC RX IP 250 OP 636: Performed by: OBSTETRICS & GYNECOLOGY

## 2024-04-23 PROCEDURE — 250N000009 HC RX 250: Performed by: ANESTHESIOLOGY

## 2024-04-23 PROCEDURE — 360N000076 HC SURGERY LEVEL 3, PER MIN: Performed by: SURGERY

## 2024-04-23 PROCEDURE — 250N000025 HC SEVOFLURANE, PER MIN: Performed by: SURGERY

## 2024-04-23 RX ORDER — NALOXONE HYDROCHLORIDE 0.4 MG/ML
0.1 INJECTION, SOLUTION INTRAMUSCULAR; INTRAVENOUS; SUBCUTANEOUS
Status: DISCONTINUED | OUTPATIENT
Start: 2024-04-23 | End: 2024-04-23 | Stop reason: HOSPADM

## 2024-04-23 RX ORDER — LIDOCAINE 40 MG/G
CREAM TOPICAL
Status: DISCONTINUED | OUTPATIENT
Start: 2024-04-23 | End: 2024-04-24 | Stop reason: HOSPADM

## 2024-04-23 RX ORDER — FENTANYL CITRATE 50 UG/ML
INJECTION, SOLUTION INTRAMUSCULAR; INTRAVENOUS PRN
Status: DISCONTINUED | OUTPATIENT
Start: 2024-04-23 | End: 2024-04-23

## 2024-04-23 RX ORDER — OXYCODONE HYDROCHLORIDE 5 MG/1
5 TABLET ORAL
Status: DISCONTINUED | OUTPATIENT
Start: 2024-04-23 | End: 2024-04-23 | Stop reason: HOSPADM

## 2024-04-23 RX ORDER — FENTANYL CITRATE 50 UG/ML
25 INJECTION, SOLUTION INTRAMUSCULAR; INTRAVENOUS
Status: DISCONTINUED | OUTPATIENT
Start: 2024-04-23 | End: 2024-04-23 | Stop reason: HOSPADM

## 2024-04-23 RX ORDER — SODIUM CHLORIDE, SODIUM LACTATE, POTASSIUM CHLORIDE, CALCIUM CHLORIDE 600; 310; 30; 20 MG/100ML; MG/100ML; MG/100ML; MG/100ML
INJECTION, SOLUTION INTRAVENOUS CONTINUOUS
Status: DISCONTINUED | OUTPATIENT
Start: 2024-04-23 | End: 2024-04-23 | Stop reason: HOSPADM

## 2024-04-23 RX ORDER — OXYCODONE HYDROCHLORIDE 5 MG/1
10 TABLET ORAL
Status: DISCONTINUED | OUTPATIENT
Start: 2024-04-23 | End: 2024-04-23 | Stop reason: HOSPADM

## 2024-04-23 RX ORDER — ONDANSETRON 2 MG/ML
4 INJECTION INTRAMUSCULAR; INTRAVENOUS EVERY 30 MIN PRN
Status: DISCONTINUED | OUTPATIENT
Start: 2024-04-23 | End: 2024-04-23 | Stop reason: HOSPADM

## 2024-04-23 RX ORDER — POLYETHYLENE GLYCOL 3350 17 G/17G
17 POWDER, FOR SOLUTION ORAL DAILY
Status: DISCONTINUED | OUTPATIENT
Start: 2024-04-24 | End: 2024-04-24 | Stop reason: HOSPADM

## 2024-04-23 RX ORDER — FENTANYL CITRATE 50 UG/ML
50 INJECTION, SOLUTION INTRAMUSCULAR; INTRAVENOUS EVERY 5 MIN PRN
Status: DISCONTINUED | OUTPATIENT
Start: 2024-04-23 | End: 2024-04-23 | Stop reason: HOSPADM

## 2024-04-23 RX ORDER — SODIUM CHLORIDE, SODIUM LACTATE, POTASSIUM CHLORIDE, AND CALCIUM CHLORIDE .6; .31; .03; .02 G/100ML; G/100ML; G/100ML; G/100ML
IRRIGANT IRRIGATION PRN
Status: DISCONTINUED | OUTPATIENT
Start: 2024-04-23 | End: 2024-04-23 | Stop reason: HOSPADM

## 2024-04-23 RX ORDER — FENTANYL CITRATE 50 UG/ML
25 INJECTION, SOLUTION INTRAMUSCULAR; INTRAVENOUS EVERY 5 MIN PRN
Status: DISCONTINUED | OUTPATIENT
Start: 2024-04-23 | End: 2024-04-23 | Stop reason: HOSPADM

## 2024-04-23 RX ORDER — AMOXICILLIN 250 MG
1 CAPSULE ORAL 2 TIMES DAILY
Status: DISCONTINUED | OUTPATIENT
Start: 2024-04-23 | End: 2024-04-24 | Stop reason: HOSPADM

## 2024-04-23 RX ORDER — HYDROMORPHONE HCL IN WATER/PF 6 MG/30 ML
0.4 PATIENT CONTROLLED ANALGESIA SYRINGE INTRAVENOUS
Status: DISCONTINUED | OUTPATIENT
Start: 2024-04-23 | End: 2024-04-24 | Stop reason: HOSPADM

## 2024-04-23 RX ORDER — SODIUM CHLORIDE 9 MG/ML
INJECTION, SOLUTION INTRAVENOUS CONTINUOUS
Status: DISCONTINUED | OUTPATIENT
Start: 2024-04-23 | End: 2024-04-24 | Stop reason: HOSPADM

## 2024-04-23 RX ORDER — HYDROMORPHONE HCL IN WATER/PF 6 MG/30 ML
0.2 PATIENT CONTROLLED ANALGESIA SYRINGE INTRAVENOUS
Status: DISCONTINUED | OUTPATIENT
Start: 2024-04-23 | End: 2024-04-24 | Stop reason: HOSPADM

## 2024-04-23 RX ORDER — HYDROMORPHONE HYDROCHLORIDE 1 MG/ML
0.2 INJECTION, SOLUTION INTRAMUSCULAR; INTRAVENOUS; SUBCUTANEOUS EVERY 5 MIN PRN
Status: DISCONTINUED | OUTPATIENT
Start: 2024-04-23 | End: 2024-04-23 | Stop reason: HOSPADM

## 2024-04-23 RX ORDER — CEFAZOLIN SODIUM/WATER 2 G/20 ML
2 SYRINGE (ML) INTRAVENOUS SEE ADMIN INSTRUCTIONS
Status: DISCONTINUED | OUTPATIENT
Start: 2024-04-23 | End: 2024-04-23 | Stop reason: HOSPADM

## 2024-04-23 RX ORDER — HEPARIN SODIUM 5000 [USP'U]/.5ML
5000 INJECTION, SOLUTION INTRAVENOUS; SUBCUTANEOUS EVERY 8 HOURS
Status: DISCONTINUED | OUTPATIENT
Start: 2024-04-23 | End: 2024-04-24 | Stop reason: HOSPADM

## 2024-04-23 RX ORDER — NALOXONE HYDROCHLORIDE 0.4 MG/ML
0.4 INJECTION, SOLUTION INTRAMUSCULAR; INTRAVENOUS; SUBCUTANEOUS
Status: DISCONTINUED | OUTPATIENT
Start: 2024-04-23 | End: 2024-04-24 | Stop reason: HOSPADM

## 2024-04-23 RX ORDER — PROPOFOL 10 MG/ML
INJECTION, EMULSION INTRAVENOUS PRN
Status: DISCONTINUED | OUTPATIENT
Start: 2024-04-23 | End: 2024-04-23

## 2024-04-23 RX ORDER — ONDANSETRON 4 MG/1
4 TABLET, ORALLY DISINTEGRATING ORAL EVERY 30 MIN PRN
Status: DISCONTINUED | OUTPATIENT
Start: 2024-04-23 | End: 2024-04-23 | Stop reason: HOSPADM

## 2024-04-23 RX ORDER — OXYCODONE HYDROCHLORIDE 5 MG/1
10 TABLET ORAL EVERY 4 HOURS PRN
Status: DISCONTINUED | OUTPATIENT
Start: 2024-04-23 | End: 2024-04-24 | Stop reason: HOSPADM

## 2024-04-23 RX ORDER — LIDOCAINE 40 MG/G
CREAM TOPICAL
Status: DISCONTINUED | OUTPATIENT
Start: 2024-04-23 | End: 2024-04-23 | Stop reason: HOSPADM

## 2024-04-23 RX ORDER — HALOPERIDOL 5 MG/ML
1 INJECTION INTRAMUSCULAR
Status: DISCONTINUED | OUTPATIENT
Start: 2024-04-23 | End: 2024-04-23 | Stop reason: HOSPADM

## 2024-04-23 RX ORDER — LABETALOL HYDROCHLORIDE 5 MG/ML
10 INJECTION, SOLUTION INTRAVENOUS
Status: DISCONTINUED | OUTPATIENT
Start: 2024-04-23 | End: 2024-04-23 | Stop reason: HOSPADM

## 2024-04-23 RX ORDER — CEFAZOLIN SODIUM/WATER 2 G/20 ML
2 SYRINGE (ML) INTRAVENOUS
Status: COMPLETED | OUTPATIENT
Start: 2024-04-23 | End: 2024-04-23

## 2024-04-23 RX ORDER — CITALOPRAM HYDROBROMIDE 10 MG/1
20 TABLET ORAL EVERY MORNING
Status: DISCONTINUED | OUTPATIENT
Start: 2024-04-23 | End: 2024-04-24 | Stop reason: HOSPADM

## 2024-04-23 RX ORDER — ALBUTEROL SULFATE 0.83 MG/ML
2.5 SOLUTION RESPIRATORY (INHALATION) EVERY 4 HOURS PRN
Status: DISCONTINUED | OUTPATIENT
Start: 2024-04-23 | End: 2024-04-23 | Stop reason: HOSPADM

## 2024-04-23 RX ORDER — NALOXONE HYDROCHLORIDE 0.4 MG/ML
0.2 INJECTION, SOLUTION INTRAMUSCULAR; INTRAVENOUS; SUBCUTANEOUS
Status: DISCONTINUED | OUTPATIENT
Start: 2024-04-23 | End: 2024-04-24 | Stop reason: HOSPADM

## 2024-04-23 RX ORDER — ACETAMINOPHEN 325 MG/1
650 TABLET ORAL EVERY 4 HOURS PRN
Status: DISCONTINUED | OUTPATIENT
Start: 2024-04-26 | End: 2024-04-24 | Stop reason: HOSPADM

## 2024-04-23 RX ORDER — BUPIVACAINE HYDROCHLORIDE 2.5 MG/ML
INJECTION, SOLUTION INFILTRATION; PERINEURAL PRN
Status: DISCONTINUED | OUTPATIENT
Start: 2024-04-23 | End: 2024-04-23 | Stop reason: HOSPADM

## 2024-04-23 RX ORDER — PROCHLORPERAZINE MALEATE 5 MG
10 TABLET ORAL EVERY 6 HOURS PRN
Status: DISCONTINUED | OUTPATIENT
Start: 2024-04-23 | End: 2024-04-24 | Stop reason: HOSPADM

## 2024-04-23 RX ORDER — HYDROMORPHONE HYDROCHLORIDE 1 MG/ML
0.4 INJECTION, SOLUTION INTRAMUSCULAR; INTRAVENOUS; SUBCUTANEOUS EVERY 5 MIN PRN
Status: DISCONTINUED | OUTPATIENT
Start: 2024-04-23 | End: 2024-04-23 | Stop reason: HOSPADM

## 2024-04-23 RX ORDER — ONDANSETRON 2 MG/ML
INJECTION INTRAMUSCULAR; INTRAVENOUS PRN
Status: DISCONTINUED | OUTPATIENT
Start: 2024-04-23 | End: 2024-04-23

## 2024-04-23 RX ORDER — ONDANSETRON 4 MG/1
4 TABLET, ORALLY DISINTEGRATING ORAL EVERY 6 HOURS PRN
Status: DISCONTINUED | OUTPATIENT
Start: 2024-04-23 | End: 2024-04-24 | Stop reason: HOSPADM

## 2024-04-23 RX ORDER — DEXAMETHASONE SODIUM PHOSPHATE 10 MG/ML
INJECTION, SOLUTION INTRAMUSCULAR; INTRAVENOUS PRN
Status: DISCONTINUED | OUTPATIENT
Start: 2024-04-23 | End: 2024-04-23

## 2024-04-23 RX ORDER — OXYCODONE HYDROCHLORIDE 5 MG/1
5 TABLET ORAL EVERY 4 HOURS PRN
Status: DISCONTINUED | OUTPATIENT
Start: 2024-04-23 | End: 2024-04-24 | Stop reason: HOSPADM

## 2024-04-23 RX ORDER — ACETAMINOPHEN 325 MG/1
975 TABLET ORAL EVERY 8 HOURS
Status: DISCONTINUED | OUTPATIENT
Start: 2024-04-23 | End: 2024-04-24 | Stop reason: HOSPADM

## 2024-04-23 RX ORDER — VANCOMYCIN HYDROCHLORIDE 1 G/200ML
1000 INJECTION, SOLUTION INTRAVENOUS EVERY 12 HOURS
Status: DISCONTINUED | OUTPATIENT
Start: 2024-04-23 | End: 2024-04-24 | Stop reason: HOSPADM

## 2024-04-23 RX ORDER — PROPOFOL 10 MG/ML
INJECTION, EMULSION INTRAVENOUS CONTINUOUS PRN
Status: DISCONTINUED | OUTPATIENT
Start: 2024-04-23 | End: 2024-04-23

## 2024-04-23 RX ORDER — BISACODYL 10 MG
10 SUPPOSITORY, RECTAL RECTAL DAILY PRN
Status: DISCONTINUED | OUTPATIENT
Start: 2024-04-26 | End: 2024-04-24 | Stop reason: HOSPADM

## 2024-04-23 RX ORDER — MEPERIDINE HYDROCHLORIDE 25 MG/ML
12.5 INJECTION INTRAMUSCULAR; INTRAVENOUS; SUBCUTANEOUS EVERY 5 MIN PRN
Status: DISCONTINUED | OUTPATIENT
Start: 2024-04-23 | End: 2024-04-23 | Stop reason: HOSPADM

## 2024-04-23 RX ORDER — ONDANSETRON 2 MG/ML
4 INJECTION INTRAMUSCULAR; INTRAVENOUS EVERY 6 HOURS PRN
Status: DISCONTINUED | OUTPATIENT
Start: 2024-04-23 | End: 2024-04-24 | Stop reason: HOSPADM

## 2024-04-23 RX ADMIN — ACETAMINOPHEN 975 MG: 325 TABLET ORAL at 19:33

## 2024-04-23 RX ADMIN — DEXAMETHASONE SODIUM PHOSPHATE 5 MG: 10 INJECTION, SOLUTION INTRAMUSCULAR; INTRAVENOUS at 01:51

## 2024-04-23 RX ADMIN — VANCOMYCIN HYDROCHLORIDE 1000 MG: 1 INJECTION, SOLUTION INTRAVENOUS at 23:52

## 2024-04-23 RX ADMIN — ROCURONIUM BROMIDE 50 MG: 50 INJECTION, SOLUTION INTRAVENOUS at 01:41

## 2024-04-23 RX ADMIN — PIPERACILLIN AND TAZOBACTAM 3.38 G: 3; .375 INJECTION, POWDER, FOR SOLUTION INTRAVENOUS at 12:06

## 2024-04-23 RX ADMIN — CITALOPRAM HYDROBROMIDE 20 MG: 10 TABLET ORAL at 08:26

## 2024-04-23 RX ADMIN — FENTANYL CITRATE 100 MCG: 50 INJECTION INTRAMUSCULAR; INTRAVENOUS at 01:37

## 2024-04-23 RX ADMIN — SODIUM CHLORIDE, POTASSIUM CHLORIDE, SODIUM LACTATE AND CALCIUM CHLORIDE: 600; 310; 30; 20 INJECTION, SOLUTION INTRAVENOUS at 01:32

## 2024-04-23 RX ADMIN — VANCOMYCIN HYDROCHLORIDE 1000 MG: 1 INJECTION, SOLUTION INTRAVENOUS at 11:11

## 2024-04-23 RX ADMIN — SUGAMMADEX 200 MG: 100 INJECTION, SOLUTION INTRAVENOUS at 02:21

## 2024-04-23 RX ADMIN — ACETAMINOPHEN 975 MG: 325 TABLET ORAL at 10:27

## 2024-04-23 RX ADMIN — PIPERACILLIN AND TAZOBACTAM 3.38 G: 3; .375 INJECTION, POWDER, FOR SOLUTION INTRAVENOUS at 19:36

## 2024-04-23 RX ADMIN — Medication 100 MG: at 01:37

## 2024-04-23 RX ADMIN — PROPOFOL 50 MG: 10 INJECTION, EMULSION INTRAVENOUS at 02:20

## 2024-04-23 RX ADMIN — SODIUM CHLORIDE, POTASSIUM CHLORIDE, SODIUM LACTATE AND CALCIUM CHLORIDE 100 ML/HR: 600; 310; 30; 20 INJECTION, SOLUTION INTRAVENOUS at 03:11

## 2024-04-23 RX ADMIN — FENTANYL CITRATE 50 MCG: 50 INJECTION, SOLUTION INTRAMUSCULAR; INTRAVENOUS at 03:28

## 2024-04-23 RX ADMIN — SODIUM CHLORIDE, POTASSIUM CHLORIDE, SODIUM LACTATE AND CALCIUM CHLORIDE: 600; 310; 30; 20 INJECTION, SOLUTION INTRAVENOUS at 01:37

## 2024-04-23 RX ADMIN — PROPOFOL 200 MG: 10 INJECTION, EMULSION INTRAVENOUS at 01:37

## 2024-04-23 RX ADMIN — LIDOCAINE HYDROCHLORIDE 5 ML: 10 INJECTION, SOLUTION EPIDURAL; INFILTRATION; INTRACAUDAL; PERINEURAL at 01:37

## 2024-04-23 RX ADMIN — HEPARIN SODIUM 5000 UNITS: 10000 INJECTION, SOLUTION INTRAVENOUS; SUBCUTANEOUS at 20:54

## 2024-04-23 RX ADMIN — PROPOFOL 50 MCG/KG/MIN: 10 INJECTION, EMULSION INTRAVENOUS at 01:41

## 2024-04-23 RX ADMIN — Medication 2 G: at 01:46

## 2024-04-23 RX ADMIN — ONDANSETRON 4 MG: 2 INJECTION INTRAMUSCULAR; INTRAVENOUS at 02:15

## 2024-04-23 RX ADMIN — ACETAMINOPHEN 975 MG: 325 TABLET ORAL at 02:54

## 2024-04-23 RX ADMIN — SODIUM CHLORIDE, POTASSIUM CHLORIDE, SODIUM LACTATE AND CALCIUM CHLORIDE 100 ML/HR: 600; 310; 30; 20 INJECTION, SOLUTION INTRAVENOUS at 00:39

## 2024-04-23 ASSESSMENT — ACTIVITIES OF DAILY LIVING (ADL)
ADLS_ACUITY_SCORE: 38
ADLS_ACUITY_SCORE: 37
ADLS_ACUITY_SCORE: 35
ADLS_ACUITY_SCORE: 37
ADLS_ACUITY_SCORE: 35
ADLS_ACUITY_SCORE: 35
ADLS_ACUITY_SCORE: 37
ADLS_ACUITY_SCORE: 38
ADLS_ACUITY_SCORE: 37
ADLS_ACUITY_SCORE: 35
ADLS_ACUITY_SCORE: 37
ADLS_ACUITY_SCORE: 37
ADLS_ACUITY_SCORE: 38

## 2024-04-23 NOTE — PROGRESS NOTES
Care Management Initial Consult    General Information  Assessment completed with:  ,  Patient       Primary Care Provider verified and updated as needed:   yes  Readmission within the last 30 days:   yes        Advance Care Planning:   no documents         Communication Assessment  Patient's communication style: spoken language (English or Bilingual)             Cognitive  Cognitive/Neuro/Behavioral: WDL  Level of Consciousness: alert  Arousal Level: opens eyes spontaneously  Orientation: oriented x 4             Living Environment:   People in home:  alone     Current living Arrangements:  cat, Rishabh    Able to return to prior arrangements:  yes       Family/Social Support:  Care provided by:  self  Provides care for:                  Description of Support System:    supportive       Current Resources:   Patient receiving home care services:  no     Community Resources:    Equipment currently used at home:    Supplies currently used at home:      Employment/Financial:  Employment Status:          Financial Concerns:             Does the patient's insurance plan have a 3 day qualifying hospital stay waiver?  No    Lifestyle & Psychosocial Needs:  Social Determinants of Health     Food Insecurity: Low Risk  (9/24/2023)    Food Insecurity     Within the past 12 months, did you worry that your food would run out before you got money to buy more?: No     Within the past 12 months, did the food you bought just not last and you didn t have money to get more?: No   Depression: Not at risk (3/26/2024)    PHQ-2     PHQ-2 Score: 0   Housing Stability: Low Risk  (9/24/2023)    Housing Stability     Do you have housing? : Yes     Are you worried about losing your housing?: No   Tobacco Use: Low Risk  (4/23/2024)    Patient History     Smoking Tobacco Use: Never     Smokeless Tobacco Use: Never     Passive Exposure: Never   Financial Resource Strain: Low Risk  (9/24/2023)    Financial Resource Strain      Within the past 12 months, have you or your family members you live with been unable to get utilities (heat, electricity) when it was really needed?: No   Alcohol Use: Not on file   Transportation Needs: Low Risk  (9/24/2023)    Transportation Needs     Within the past 12 months, has lack of transportation kept you from medical appointments, getting your medicines, non-medical meetings or appointments, work, or from getting things that you need?: No   Physical Activity: Not on file   Interpersonal Safety: Low Risk  (3/26/2024)    Interpersonal Safety     Do you feel physically and emotionally safe where you currently live?: Yes     Within the past 12 months, have you been hit, slapped, kicked or otherwise physically hurt by someone?: No     Within the past 12 months, have you been humiliated or emotionally abused in other ways by your partner or ex-partner?: No   Stress: Not on file   Social Connections: Not on file   Health Literacy: Not on file       Functional Status:  Prior to admission patient needed assistance: independent             Mental Health Status: seeing therapist for grief/loss          Chemical Dependency Status: no concerns                Values/Beliefs:  Spiritual, Cultural Beliefs, Buddhism Practices, Values that affect care:     Latter day            Additional Information:  Patient is alert,oriented and independent. She expects to discharge to home with family /friend to transport. No needs identified.    SAIDA Aburto

## 2024-04-23 NOTE — PROGRESS NOTES
S: patient feeling well postoperatively.  She has drain in place at this time from surgery.    O: vs 116.70, 98.3  Gen: alert, resting  Abd: incisions with some bruising, no erythema    A/P: 52 year old female POD 0 from general surgery exploration for concern for bowel injury following hysterectomy, abscess was found and drained     Will do 24 hours of zosyn and vanc at this time, ordered pharmacy to dose this am  Will send home with 2 weeks of oral antibiotics, there wasn't a culture obtained during surgery  Possible discharge tomorrow

## 2024-04-23 NOTE — PROGRESS NOTES
General Surgery Progress Note:    Hospital Day # 0    ASSESSMENT:  1. Pneumoperitoneum    2. Perforated viscus    3. Generalized abdominal pain    4. Status post hysterectomy        Luz Sharpe is a 53 year old female who is s/p diagnostic laparoscopy and drainage of intra-abdominal abscess on 4/23/24. Patient doing well post-operatively. Pain adequately controlled. Surgical drain with serosanguinous output. Discussed with OB/Gyn.     PLAN:  - Diet as tolerated from general surgery standpoint  - Remainder of post-op care, drain management and antibiotics per OB/gyn service  - General surgery will sign off at this time. Please page/call if further questions.    SUBJECTIVE:   She is feeling well this morning. Pain adequately controlled. Tolerating a regular diet without issues. Denies fever, chills, nausea, vomiting. Having bowel function. Surgical drain with serosanguinous output. Ambulating and voiding independently.      Patient Vitals for the past 24 hrs:   BP Temp Temp src Pulse Resp SpO2 Weight   04/23/24 0804 116/70 98.3  F (36.8  C) Oral 82 18 98 % --   04/23/24 0415 118/70 99.3  F (37.4  C) Oral 86 16 96 % 59.3 kg (130 lb 11.2 oz)   04/23/24 0400 -- -- -- 91 -- 99 % --   04/23/24 0345 117/69 (!) 100.8  F (38.2  C) Temporal 89 17 100 % --   04/23/24 0330 111/63 -- -- 92 11 94 % --   04/23/24 0328 -- -- -- 93 19 96 % --   04/23/24 0315 121/69 (!) 103  F (39.4  C) Temporal 93 18 96 % --   04/23/24 0313 -- (!) 102.8  F (39.3  C) Temporal 99 25 95 % --   04/23/24 0300 122/70 (!) 104.3  F (40.2  C) Temporal 98 20 96 % --   04/23/24 0254 -- (!) 103.5  F (39.7  C) -- 99 18 95 % --   04/23/24 0245 119/59 -- -- 105 21 95 % --   04/23/24 0235 110/70 (!) 103.5  F (39.7  C) Temporal 106 23 95 % --   04/23/24 0231 110/70 (!) 102.4  F (39.1  C) Temporal 115 20 95 % --   04/23/24 0035 118/63 98.8  F (37.1  C) Temporal 77 20 97 % --   04/23/24 0000 117/67 -- -- 84 -- 98 % --   04/22/24 2345 107/62 98.8  F (37.1  C) Oral  78 -- 96 % --   04/22/24 2315 110/65 -- -- 88 -- 97 % --   04/22/24 2158 104/63 -- -- 91 -- 93 % --   04/22/24 2115 120/68 -- -- 94 -- 93 % --   04/22/24 1829 104/80 (!) 101.8  F (38.8  C) Temporal 109 20 96 % --         PHYSICAL EXAM:  General: patient seen resting in bed, no acute distress  Resp: no respiratory distress, breathing comfortably on room air  Abdomen: Soft, mildly tender to palpation over incisions, non-distended. Incisions clean/dry/intact with overlying steri strips.   Drains: Surgical LAZARO drain x1 with serosanguinous output.   Output by Drain (mL) 04/21/24 0700 - 04/21/24 1459 04/21/24 1500 - 04/21/24 2259 04/21/24 2300 - 04/22/24 0659 04/22/24 0700 - 04/22/24 1459 04/22/24 1500 - 04/22/24 2259 04/22/24 2300 - 04/23/24 0659 04/23/24 0700 - 04/23/24 1043   Closed/Suction Drain 1 LUQ Bulb 19 [place]      10    Extremities: warm and well perfused    04/22 0700 - 04/23 0659  In: 1500 [I.V.:500]  Out: 1135 [Urine:1125; Drains:10]    Admission on 04/22/2024   Component Date Value    Color Urine 04/22/2024 Yellow     Appearance Urine 04/22/2024 Clear     Glucose Urine 04/22/2024 Negative     Bilirubin Urine 04/22/2024 Negative     Ketones Urine 04/22/2024 40 (A)     Specific Gravity Urine 04/22/2024 1.022     Blood Urine 04/22/2024 Negative     pH Urine 04/22/2024 6.5     Protein Albumin Urine 04/22/2024 20 (A)     Urobilinogen Urine 04/22/2024 <2.0     Nitrite Urine 04/22/2024 Negative     Leukocyte Esterase Urine 04/22/2024 Negative     Mucus Urine 04/22/2024 Present (A)     RBC Urine 04/22/2024 1     WBC Urine 04/22/2024 1     Hyaline Casts Urine 04/22/2024 2     Hold Specimen 04/22/2024 JIC     Hold Specimen 04/22/2024 JIC     Hold Specimen 04/22/2024 JIC     Hold Specimen 04/22/2024 JIC     Hold Specimen 04/22/2024 [facility]     Lactic Acid 04/22/2024 1.0     Sodium 04/22/2024 134 (L)     Potassium 04/22/2024 3.8     Carbon Dioxide (CO2) 04/22/2024 27     Anion Gap 04/22/2024 11     Urea Nitrogen 04/22/2024  14.2     Creatinine 04/22/2024 0.66     GFR Estimate 04/22/2024 >90     Calcium 04/22/2024 9.3     Chloride 04/22/2024 96 (L)     Glucose 04/22/2024 115 (H)     Alkaline Phosphatase 04/22/2024 67     AST 04/22/2024 22     ALT 04/22/2024 19     Protein Total 04/22/2024 7.1     Albumin 04/22/2024 3.7     Bilirubin Total 04/22/2024 0.7     WBC Count 04/22/2024 10.6     RBC Count 04/22/2024 4.35     Hemoglobin 04/22/2024 12.3     Hematocrit 04/22/2024 36.8     MCV 04/22/2024 85     MCH 04/22/2024 28.3     MCHC 04/22/2024 33.4     RDW 04/22/2024 12.9     Platelet Count 04/22/2024 234     % Neutrophils 04/22/2024 85     % Lymphocytes 04/22/2024 5     % Monocytes 04/22/2024 9     % Eosinophils 04/22/2024 1     % Basophils 04/22/2024 0     % Immature Granulocytes 04/22/2024 0     NRBCs per 100 WBC 04/22/2024 0     Absolute Neutrophils 04/22/2024 9.0 (H)     Absolute Lymphocytes 04/22/2024 0.6 (L)     Absolute Monocytes 04/22/2024 0.9     Absolute Eosinophils 04/22/2024 0.1     Absolute Basophils 04/22/2024 0.0     Absolute Immature Granul* 04/22/2024 0.0     Absolute NRBCs 04/22/2024 0.0     Radiologist flags 04/22/2024 Perforated viscus (AA)         Lucina Dunn PA-C  [facility] General Surgery  [address]  [address]  [place] [address]

## 2024-04-23 NOTE — ANESTHESIA CARE TRANSFER NOTE
Patient: Luz Sharpe    Procedure: Procedure(s):  DIAGNOSTIC LAPAROSCOPY AND DRAINAGE OF INTRA-ABDOMINAL ABCESS       Diagnosis: Pneumoperitoneum [K66.8]  Diagnosis Additional Information: No value filed.    Anesthesia Type:   General     Note:    Oropharynx: oropharynx clear of all foreign objects  Level of Consciousness: drowsy  Oxygen Supplementation: room air    Independent Airway: airway patency satisfactory and stable  Dentition: dentition unchanged  Vital Signs Stable: post-procedure vital signs reviewed and stable  Report to RN Given: handoff report given  Patient transferred to: PACU    Handoff Report: Identifed the Patient, Identified the Reponsible Provider, Reviewed the pertinent medical history, Discussed the surgical course, Reviewed Intra-OP anesthesia mangement and issues during anesthesia, Set expectations for post-procedure period and Allowed opportunity for questions and acknowledgement of understanding      Vitals:  Vitals Value Taken Time   /70 04/23/24 0231   Temp 39.1  C (102.4  F) 04/23/24 0231   Pulse 115 04/23/24 0231   Resp 20 04/23/24 0231   SpO2 95 % 04/23/24 0231       Electronically Signed By: JOANNE Jeffers CRNA  April 23, 2024  2:33 AM

## 2024-04-23 NOTE — PLAN OF CARE
Problem: Surgery Nonspecified  Goal: Absence of Bleeding  Outcome: Progressing  Goal: Effective Bowel Elimination  Outcome: Progressing  Goal: Fluid and Electrolyte Balance  Outcome: Progressing  Goal: Blood Glucose Level Within Targeted Range  Outcome: Progressing  Goal: Absence of Infection Signs and Symptoms  Outcome: Progressing  Goal: Anesthesia/Sedation Recovery  Outcome: Progressing  Goal: Optimal Pain Control and Function  Outcome: Progressing  Goal: Nausea and Vomiting Relief  Outcome: Progressing  Goal: Effective Urinary Elimination  Outcome: Progressing  Goal: Effective Oxygenation and Ventilation  Outcome: Progressing     Problem: Pain Acute  Goal: Optimal Pain Control and Function  Outcome: Progressing   Goal Outcome Evaluation:         Pt denies pain.  Pt has jose drain in place with small amount of drainage.    Pt has castelan in place.  Pt was given ice for abdomen.  Surgical sites are CDI.    PT ate breakfast and tolerate well.  Pt is NSR on cardiac tele monitoring.

## 2024-04-23 NOTE — ANESTHESIA PREPROCEDURE EVALUATION
Anesthesia Pre-Procedure Evaluation    Patient: Luz Sharpe   MRN: [id number] : 1970        Procedure : Procedure(s):  LAPAROSCOPY  LAPAROTOMY          Past Medical History:   Diagnosis Date    Cancer (H)     squamous    History of colonic polyps     Ovarian cyst     PMB (postmenopausal bleeding)       Past Surgical History:   Procedure Laterality Date    HYSTERECTOMY, ROBOT-ASSISTED, USING DA ROBERTO XI, WITH SALPINGO-OOPHORECTOMY, CYSTOSCOPY Bilateral 2024    Procedure: ROBOTIC ASSISTED LAPAROSCOPIC HYSTERECTOMY, BILATERAL SALPINGO-OOPHORECTOMY,  CYSTOSCOPY;  Surgeon: Negin Pink MD;  Location: [facility] - [place] OR    IR FINE NEEDLE ASPIRATION W ULTRASOUND  11/10/2023    LYSIS, ADHESIONS, ROBOT-ASSISTED, LAPAROSCOPIC, USING DA ROBERTO XI N/A 2024    Procedure: LYSIS OF ADHESIONS;  Surgeon: Negin Pink MD;  Location: [facility] - [place] OR    TONSILLECTOMY        Allergies   Allergen Reactions    Fish Oil Swelling    Fish Oil Unknown    Fish-Derived Products      Finned-fish (salmon, tuna, Cod fish)      Social History     Tobacco Use    Smoking status: Never     Passive exposure: Never    Smokeless tobacco: Never   Substance Use Topics    Alcohol use: Yes     Comment: 3-5 drinks per week      Wt Readings from Last 1 Encounters:   24 58.5 kg (129 lb)        Anesthesia Evaluation            ROS/MED HX  ENT/Pulmonary:  - neg pulmonary ROS     Neurologic:  - neg neurologic ROS     Cardiovascular:  - neg cardiovascular ROS     METS/Exercise Tolerance:     Hematologic:       Musculoskeletal:       GI/Hepatic: Comment: pneumoperitoneum - neg GI/hepatic ROS     Renal/Genitourinary:  - neg Renal ROS     Endo: Comment: Hyponatremia noted - neg endo ROS     Psychiatric/Substance Use:       Infectious Disease:       Malignancy:       Other:     (-) Any chance pregnant       Physical Exam    Airway        Mallampati: II   TM distance: > 3 FB   Neck ROM: full   Mouth opening: > 3 cm    Respiratory  "Devices and Support         Dental       (+) Minor Abnormalities - some fillings, tiny chips      Cardiovascular          Rhythm and rate: regular and normal     Pulmonary           breath sounds clear to auscultation           OUTSIDE LABS:  CBC:   Lab Results   Component Value Date    WBC 10.6 04/22/2024    WBC 4.7 11/10/2023    HGB 12.3 04/22/2024    HGB 13.3 04/17/2024    HCT 36.8 04/22/2024    HCT 42.5 11/10/2023     04/22/2024     11/10/2023     BMP:   Lab Results   Component Value Date     (L) 04/22/2024     11/09/2022    POTASSIUM 3.8 04/22/2024    POTASSIUM 3.9 11/09/2022    CHLORIDE 96 (L) 04/22/2024    CHLORIDE 102 11/09/2022    CO2 27 04/22/2024    CO2 24 11/09/2022    BUN 14.2 04/22/2024    BUN 16.2 11/09/2022    CR 0.66 04/22/2024    CR 0.72 11/09/2022     (H) 04/22/2024    GLC 88 11/09/2022     COAGS:   Lab Results   Component Value Date    PTT 27 04/08/2024    INR 1.17 (H) 03/26/2024     POC: No results found for: \"BGM\", \"HCG\", \"HCGS\"  HEPATIC:   Lab Results   Component Value Date    ALBUMIN 3.7 04/22/2024    PROTTOTAL 7.1 04/22/2024    ALT 19 04/22/2024    AST 22 04/22/2024    ALKPHOS 67 04/22/2024    BILITOTAL 0.7 04/22/2024     OTHER:   Lab Results   Component Value Date    LACT 1.0 04/22/2024    PRASAD 9.3 04/22/2024    TSH 1.43 11/09/2022    T4 1.11 11/09/2022       Anesthesia Plan    ASA Status:  3, emergent    NPO Status:  ELEVATED Aspiration Risk/Unknown    Anesthesia Type: General.     - Airway: ETT   Induction: Propofol, RSI.           Consents    Anesthesia Plan(s) and associated risks, benefits, and realistic alternatives discussed. Questions answered and patient/representative(s) expressed understanding.     - Discussed:     - Discussed with:  Patient      - Extended Intubation/Ventilatory Support Discussed: No.      - Patient is DNR/DNI Status: No     Use of blood products discussed: No .     Postoperative Care       PONV prophylaxis: Ondansetron (or other " 5HT-3), Dexamethasone or Solumedrol, Background Propofol Infusion     Comments:    Other Comments: Last ate >12 hours ago. Does feel somewhat nauseous. Plan for GETA, RSI,  background propofol.            Yonas Peña MD    I have reviewed the pertinent notes and labs in the chart from the past 30 days and (re)examined the patient.  Any updates or changes from those notes are reflected in this note.     # Hyponatremia: Lowest Na = 134 mmol/L in last 30 days, will monitor as appropriate        # Coagulation Defect: INR = 1.17 (Ref range: 0.85 - 1.15) and/or PTT = 27 Seconds (Ref range: 22 - 38 Seconds), will monitor for bleeding

## 2024-04-23 NOTE — ED NOTES
Bed: [facility] [place]  Expected date:   Expected time:   Means of arrival: Walked  Comments:

## 2024-04-23 NOTE — ANESTHESIA POSTPROCEDURE EVALUATION
Patient: Luz Sharpe    Procedure: Procedure(s):  DIAGNOSTIC LAPAROSCOPY AND DRAINAGE OF INTRA-ABDOMINAL ABCESS       Anesthesia Type:  General    Note:  Disposition: Inpatient   Postop Pain Control: Uneventful            Sign Out: Well controlled pain   PONV: No   Neuro/Psych: Uneventful            Sign Out: Acceptable/Baseline neuro status   Airway/Respiratory: Uneventful            Sign Out: Acceptable/Baseline resp. status   CV/Hemodynamics: Uneventful            Sign Out: Acceptable CV status   Other NRE:    DID A NON-ROUTINE EVENT OCCUR?            Last vitals:  Vitals Value Taken Time   /70 04/23/24 0231   Temp 39.1  C (102.4  F) 04/23/24 0231   Pulse 105 04/23/24 0240   Resp 23 04/23/24 0240   SpO2 95 % 04/23/24 0240   Vitals shown include unfiled device data.    Electronically Signed By: Yonas Peña MD  April 23, 2024  2:41 AM

## 2024-04-23 NOTE — OP NOTE
General Surgery Operative Note    Date of Surgery: 4/23/2024     Surgeon: Chris Rendon MD    Assistant: None    Preoperative Diagnosis: Pneumoperitoneum    Postoperative Diagnosis: Intra-abdominal abscess     Procedure: Diagnostic laparoscopy and drainage of intra-abdominal abscess    EBL: 5 cc    Findings: No gross spillage anywhere throughout the abdomen.  Abscess cavity unroofed in the pelvis directly overlying the vaginal cuff suture line.  There is no stool in this abscess cavity.  There is no evidence of colonic or small bowel perforation    Indications:  Patient is a 53-year-old woman who is postoperative day 4 from a robotic assisted hysterectomy.  She presented to the emergency department this evening with a fever.  Her workup revealed a fluid collection or pelvis along with pneumoperitoneum.  Her primary gynecology team was consulted and they felt that this was colonic in nature and deferred management to general surgery.  After discussion with the patient about the details of diagnostic laparoscopy and possible laparotomy with colon resection and potentially even colostomy, the patient consented to the operation.    Details of operation:  The patient was brought to the operating room and laid on the table in the supine position.  General anesthesia was induced without incident.  The patient was prepped and draped in usual sterile fashion.  A timeout for safety was performed.    I began by reopening the left upper quadrant incision and a Veress needle was introduced.  Pneumoperitoneum was established that incident.  A 5 mm optical port was placed the site.  There is no injury with entry.  Immediately upon examining the abdomen it was clear there was no free spillage or really even any fluid throughout the abdomen.  All of the upper abdominal organs appeared pristine.  The other 2 upper abdominal incisions were opened and 5 mm ports placed.  Patient was placed in Trendelenburg position.  Then turned  my attention to the pelvis.  The small bowel was adherent to the bladder and these inflammatory lesions were bluntly taken down.  I then entered the pelvis around the left side of the sigmoid colon.  I quickly entered into an abscess cavity where the uterus had been previously.  The contents were purulent thin fluid that was suctioned clean.  There was no stool or succus in this cavity.  I continued to take down all of the fibrinous inflammatory adhesions until I fully exposed this abscess cavity.  It was lying directly on the vaginal cuff suture line.  I did not appreciate any clear dehiscence but I assume that is the source of the abscess.  The small bowel was completely reduced out of the pelvis and there is no evidence of small bowel injury.  The colon itself also appeared to be intact without any evidence of injury.  Romo catheter was inserted to drain the bladder so we could fully appreciate this area.  We then irrigated the abscess cavity clean.  A 19 Sami round Daniel drain was then placed into the abscess cavity through the left upper quadrant incision.  This was secured in place with an 0 silk suture.  Insufflation was released and all the ports removed.  The remaining 2 incisions were closed with 4-0 Monocryl suture and then Steri-Strips were used as dressings.  The patient tolerated the procedure well.  There were no immediately apparent complications.        Chris Rendon MD  General Surgeon  [facility]  Surgery [facility] - [address] [address]?  Office: [phone]

## 2024-04-23 NOTE — PHARMACY-VANCOMYCIN DOSING SERVICE
"Pharmacy Vancomycin Initial Note  Date of Service 2024  Patient's  1970  53 year old, female    Indication: Abscess    Current estimated CrCl = Estimated Creatinine Clearance: 92.3 mL/min (based on SCr of 0.66 mg/dL).    Creatinine for last 3 days  2024:  7:07 PM Creatinine 0.66 mg/dL    Recent Vancomycin Level(s) for last 3 days  No results found for requested labs within last 3 days.      Vancomycin IV Administrations (past 72 hours)        No vancomycin orders with administrations in past 72 hours.                    Nephrotoxins and other renal medications (From now, onward)      Start     Dose/Rate Route Frequency Ordered Stop    24 1100  vancomycin (VANCOCIN) 1,000 mg in 200 mL dextrose intermittent infusion         1,000 mg  200 mL/hr over 1 Hours Intravenous EVERY 12 HOURS 24 1041      24 0400  piperacillin-tazobactam (ZOSYN) 3.375 g vial to attach to  mL bag        Note to Pharmacy: For [facility], [facility] and [facility]: For Zosyn-naive patients, use the \"Zosyn initial dose + extended infusion\" order panel.    3.375 g  over 240 Minutes Intravenous EVERY 8 HOURS 24 2159              Contrast Orders - past 72 hours (72h ago, onward)      Start     Dose/Rate Route Frequency Stop    24 2200  iopamidol (ISOVUE-370) solution 64 mL         64 mL Intravenous ONCE 24 2137            [employer] Prediction of Planned Initial Vancomycin Regimen    Loading dose: N/A  Regimen: 1000 mg IV every 12 hours.  Start time: 10:40 on 2024  Exposure target: AUC24 (range)400-600 mg/L.hr   AUC24,ss: 536 mg/L.hr  Probability of AUC24 > 400: 79 %  Ctrough,ss: 16.1 mg/L  Probability of Ctrough,ss > 20: 32 %  Probability of nephrotoxicity (Lodise TIFF ): 11 %          Plan:  Start vancomycin  1000 mg IV q12h.   Vancomycin monitoring method: AUC  Vancomycin therapeutic monitoring goal: 400-600 mg*h/L  Pharmacy will check vancomycin levels as appropriate in 1-3 Days.    Serum " creatinine levels will be ordered daily for the first week of therapy and at least twice weekly for subsequent weeks.      Marlen Brink RPH

## 2024-04-23 NOTE — ED PROVIDER NOTES
EMERGENCY DEPARTMENT ENCOUNTER      NAME: Luz Sharpe  YOB: 1970  MRN: [id number]    FINAL IMPRESSION  1. Pneumoperitoneum    2. Perforated viscus    3. Generalized abdominal pain    4. Status post hysterectomy        MEDICAL DECISION MAKING   Pertinent Labs & Imaging studies reviewed. (See chart for details)    Luz Sharpe is a 53 year old female who presents for evaluation of lower abdominal pain, low back pain, fever, diarrhea.  Records reviewed.  Patient was admitted 4/17/2024 and underwent hysterectomy with Dr. Pink of [facility].  Patient states that her current symptoms have been progressively worsening over the last few days.  Vitals on arrival notable for temp of 102.4  F and tachycardia with heart rate of 115.    Considered a broad differential including not limited to perforation, seroma, hematoma, abscess, wound dehiscence, or other postoperative complication would certainly be of greatest concern.  Discussed options for workup and management with patient.  We have agreed on plan for labs, CT abdomen/pelvis, and management of symptoms with IV fluids and IV antiemetic/analgesic as well as Tylenol for fever.    ED Course as of 04/23/24 0249   Mon Apr 22, 2024 2125 CBC with platelets differential(!)  CBC reassuring. No evidence of leukocytosis to suggest systemic infectious/inflammatory process. No acute anemia. PLTs wnl.    2125 Lactic Acid: 1.0  Lactate within normal limits, less likely end-organ ischemia or systemic infectious process.    2125 Comprehensive metabolic panel(!)  CMP reassuring. No evidence of JESSE, acidosis, or significant electrolyte derangement. No acute elevation of bilirubin or transaminates to suggest acute hepatobiliary process.    2125 UA with Microscopic reflex to Culture(!)  UA without evidence of infection. No hematuria to suggest nephrolithiasis/ureterolithiasis.      Patient had relatively rapid improvement in symptoms with initial  interventions.  I rechecked her multiple times and reviewed results.   I rechecked the patient again and updated with plan to take to the OR.  Again chair, she remained very comfortable here and was agreeable    I spoke with radiologist regarding results of CT scan which was concerning for perforation, most likely of the sigmoid colon or the vaginal cuff.  With this, I did order broad-spectrum antibiotics.  I discussed the case with Dr. Haney, on-call for [facility].  Given concern for perforation of the colon, he did recommend that I review CT scan with general surgery.  I discussed the case with Dr. Rendon who graciously agreed to review imaging.  I updated the patient after these conversations.  I again discussed the case with Dr. Rendon who would like to page the patient to the OR this evening for a scope.  I did attempt to admit the patient to hospital medicine team but they declined and stated that this would be more appropriate for general surgery to admit primarily.  I did update  Dr. Rendon with this request.    With recommendation for admission.  Patient was transported directly to the OR from the ED with plans to admit to general surgery.  No acute events under my care.        Medical Decision Making  Obtained supplemental history:Supplemental history obtained?: No  Reviewed external records: External records reviewed?: Documented in chart  Care impacted by chronic illness:N/A  Care significantly affected by social determinants of health:Access to Medical Care  Did you consider but not order tests?: Work up considered but not performed and documented in chart, if applicable  Did you interpret images independently?: Independent interpretation of ECG and images noted in documentation, when applicable.  Consultation discussion with other provider:Did you involve another provider (consultant, , pharmacy, etc.)?: I discussed the care with another health care provider, see documentation for  details.  Admit.      Critical care: 60 minutes excluding separately billable procedures.  Includes bedside management, time reviewing test results, review of records, discussing the case with staff, documenting the medical record and time spent with family members (or surrogate decision makers) discussing specific treatment issues.       ED COURSE  7:36 PM I met the patient and performed my initial interview and exam.   9:58 PM I spoke with the radiologist regarding the patient.  9:59 PM I rechecked and updated the patient.  10:25 PM I spoke with Dr. Haney, [facility].  10:32 PM I spoke with Dr. Rendon, surgery, about patient plan of care.   10:35 PM I rechecked and updated the patient.  10:48 PM I spoke with Dr. Rendon.   10:48 PM I updated the patient on the plan.   11:29 PM I spoke with Dr. Glass, hospitalist.  11:36 PM I spoke with Dr. Rendon, general surgery.     MEDICATIONS GIVEN IN THE ED  Medications   piperacillin-tazobactam (ZOSYN) 3.375 g vial to attach to  mL bag ( Intravenous Automatically Held 4/26/24 2000)   lactated ringers infusion (has no administration in time range)   fentaNYL (PF) (SUBLIMAZE) injection 25 mcg (has no administration in time range)   fentaNYL (PF) (SUBLIMAZE) injection 50 mcg (has no administration in time range)   HYDROmorphone (PF) (DILAUDID) injection 0.2 mg (has no administration in time range)   HYDROmorphone (PF) (DILAUDID) injection 0.4 mg (has no administration in time range)   labetalol (NORMODYNE/TRANDATE) injection 10 mg (has no administration in time range)   meperidine (DEMEROL) injection 12.5 mg (has no administration in time range)   albuterol (PROVENTIL) neb solution 2.5 mg (has no administration in time range)   ondansetron (ZOFRAN ODT) ODT tab 4 mg (has no administration in time range)     Or   ondansetron (ZOFRAN) injection 4 mg (has no administration in time range)   prochlorperazine (COMPAZINE) injection 5 mg (has no administration in time  range)   haloperidol lactate (HALDOL) injection 1 mg (has no administration in time range)   naloxone (NARCAN) injection 0.1 mg (has no administration in time range)   fentaNYL (PF) (SUBLIMAZE) injection 25 mcg (has no administration in time range)   oxyCODONE (ROXICODONE) tablet 5 mg (has no administration in time range)   oxyCODONE (ROXICODONE) tablet 10 mg (has no administration in time range)   ondansetron (ZOFRAN ODT) ODT tab 4 mg (has no administration in time range)     Or   ondansetron (ZOFRAN) injection 4 mg (has no administration in time range)   prochlorperazine (COMPAZINE) injection 5 mg (has no administration in time range)   naloxone (NARCAN) injection 0.1 mg (has no administration in time range)   acetaminophen (TYLENOL) tablet 650 mg (650 mg Oral $Given 4/22/24 1907)   sodium chloride 0.9% BOLUS 1,000 mL (0 mLs Intravenous Stopped 4/22/24 2026)   ondansetron (ZOFRAN) injection 4 mg (4 mg Intravenous $Given 4/22/24 1950)   HYDROmorphone (DILAUDID) injection 0.5 mg (0.5 mg Intravenous $Given 4/22/24 2109)   iopamidol (ISOVUE-370) solution 64 mL (64 mLs Intravenous $Given 4/22/24 2137)   piperacillin-tazobactam (ZOSYN) 3.375 g vial to attach to  mL bag (0 g Intravenous Stopped 4/22/24 2341)   ceFAZolin Sodium (ANCEF) injection 2 g (2 g Intravenous $Given 4/23/24 0146)       NEW PRESCRIPTIONS STARTED AT TODAY'S VISIT  Current Discharge Medication List             =================================================================    Chief Complaint   Patient presents with    Post-op Problem         HPI:    Patient information was obtained from: The patient     Use of : N/A    Luz Sharpe is a 53 year old female who presents for evaluation of fatigue, nausea, diarrhea, and back pain.    Per Chart Review: The patient had a laparoscopic hysterectomy on 4/17/24 at [facility] without complication.     The patient reports that she had a hysterectomy last week and has felt unwell  since. She started to develop lower back pain, nausea, and diarrhea over the weekend. She also notes that she has been sleeping all day since the surgery. She ate some food today but has been unable to eat much all week. Today around 9 AM, her son took her temperature and she had a fever and chills. She took some tylenol today at 9 AM which helped her fever. She also endorses some slight abdominal pain. She denies any vomiting or urinary symptoms. She has some increased pressure with urination, but no pain. She has a follow up appointment scheduled for May 1. She has no known sick contacts.       RELEVANT HISTORY, MEDICATIONS, & ALLERGIES   Past medical history, surgical history, family history, medications, and allergies reviewed and pertinent noted in HPI.    REVIEW OF SYSTEMS:  A complete review of systems was performed with pertinent positives and negatives noted in the HPI. All other systems negative.     PHYSICAL EXAM:    Vitals: /59   Pulse 105   Temp (!) 103.5  F (39.7  C) (Temporal)   Resp 21   SpO2 95%    General: Alert and interactive, comfortable appearing.  HENT: Atraumatic. Full AROM of neck. Conjunctiva clear.   Cardiovascular: Tachycardic, regular.   Chest/Pulmonary: Normal work of breathing. Speaking in complete sentences. Lungs CTAB. No chest wall tenderness or deformities.  Abdomen: Soft, nondistended. Mild TTP lower abdomen without guarding or rebound. Laparoscopic sites intact without erythema, discharge drainage.   Extremities: Normal AROM of all major joints.  Skin: Warm and dry. Normal skin color.   Neuro: Speech clear. CNs grossly intact. Moves all extremities spontaneously.   Psych: Normal affect/mood, cooperative, memory appropriate.      LAB  Labs Ordered and Resulted from Time of ED Arrival to Time of ED Departure   ROUTINE UA WITH MICROSCOPIC REFLEX TO CULTURE - Abnormal       Result Value    Color Urine Yellow      Appearance Urine Clear      Glucose Urine Negative       Bilirubin Urine Negative      Ketones Urine 40 (*)     Specific Gravity Urine 1.022      Blood Urine Negative      pH Urine 6.5      Protein Albumin Urine 20 (*)     Urobilinogen Urine <2.0      Nitrite Urine Negative      Leukocyte Esterase Urine Negative      Mucus Urine Present (*)     RBC Urine 1      WBC Urine 1      Hyaline Casts Urine 2     COMPREHENSIVE METABOLIC PANEL - Abnormal    Sodium 134 (*)     Potassium 3.8      Carbon Dioxide (CO2) 27      Anion Gap 11      Urea Nitrogen 14.2      Creatinine 0.66      GFR Estimate >90      Calcium 9.3      Chloride 96 (*)     Glucose 115 (*)     Alkaline Phosphatase 67      AST 22      ALT 19      Protein Total 7.1      Albumin 3.7      Bilirubin Total 0.7     CBC WITH PLATELETS AND DIFFERENTIAL - Abnormal    WBC Count 10.6      RBC Count 4.35      Hemoglobin 12.3      Hematocrit 36.8      MCV 85      MCH 28.3      MCHC 33.4      RDW 12.9      Platelet Count 234      % Neutrophils 85      % Lymphocytes 5      % Monocytes 9      % Eosinophils 1      % Basophils 0      % Immature Granulocytes 0      NRBCs per 100 WBC 0      Absolute Neutrophils 9.0 (*)     Absolute Lymphocytes 0.6 (*)     Absolute Monocytes 0.9      Absolute Eosinophils 0.1      Absolute Basophils 0.0      Absolute Immature Granulocytes 0.0      Absolute NRBCs 0.0     LACTIC ACID WHOLE BLOOD - Normal    Lactic Acid 1.0         RADIOLOGY  CT Abdomen Pelvis w Contrast   Final Result   Abnormal   IMPRESSION:    1.  Large volume free air greater than expected for recent hysterectomy. In addition, there is a large complex air and fluid containing collection in the pelvis with 2 communicating pockets. These measure 9 x 5 and 6 x 5 cm respectively with peripheral    enhancement. These abut the sigmoid colon and vaginal cuff. Given the large volume free air, a perforated sigmoid colon is favored over vaginal cuff dehiscence. Surgical consultation is recommended.   2.  There is diffuse circumferential wall  thickening of several loops of small bowel in the pelvis. These also could be the source of perforation but are more likely related to reactive changes of the complex pelvic fluid collection.         [Critical Result: Perforated viscus]      Finding was identified on 4/22/2024 9:49 PM CDT.       Radha Wilson was contacted by me on 4/22/2024 9:57 PM CDT and verbalized understanding of the critical result.               I, Catalina Bates, am serving as a scribe to document services personally performed by Dr. Radha Wilson based on my observation and the provider's statements to me. I, Radha Wilson MD attest that Catalina Bates is acting in a scribe capacity, has observed my performance of the services and has documented them in accordance with my direction.    Radha Wilson M.D.  Emergency Medicine  [facility] EMERGENCY DEPARTMENT  [facility] [facility] [address]  [phone]  Dept: [phone]     Radha Wilson MD  04/23/24 0249

## 2024-04-23 NOTE — H&P
General Surgery Consult    Luz Sharpe MRN# [id number]     Date of Admission: 4/22/2024    Reason for Consult  Postoperative pneumoperitoneum    History of Present Illness  Patient is a 53-year-old woman who is 4 days out from a robotic assisted hysterectomy who presents to the emergency department for evaluation of fever.  She states that she has had abdominal and back pain really since surgery.  She actually is feeling a little bit better today but did have a fever this afternoon which prompted her to present to the emergency department.  Denies significant nausea or vomiting.  She has been having bowel movements mostly diarrhea.  She denies any blood in her stool.  Her workup in the emergency department revealed tachycardia and a fever as well as a normal white blood cell count.  A CT scan was performed that showed fairly substantial pneumoperitoneum more than would be expected after surgery as well as some pelvic fluid collections.  There is some thickening of the sigmoid colon concerning for a colon injury as a source of these findings.  Her primary surgical team was contacted and requested that general surgery to evaluate the patient.     Past Medical History:  Past Medical History:   Diagnosis Date    Cancer (H)     squamous    History of colonic polyps     Ovarian cyst     PMB (postmenopausal bleeding)        Past Surgical History:  Past Surgical History:   Procedure Laterality Date    HYSTERECTOMY, ROBOT-ASSISTED, USING DA ROBERTO XI, WITH SALPINGO-OOPHORECTOMY, CYSTOSCOPY Bilateral 4/17/2024    Procedure: ROBOTIC ASSISTED LAPAROSCOPIC HYSTERECTOMY, BILATERAL SALPINGO-OOPHORECTOMY,  CYSTOSCOPY;  Surgeon: Negin Pink MD;  Location: [facility] OR    IR FINE NEEDLE ASPIRATION W ULTRASOUND  11/10/2023    LYSIS, ADHESIONS, ROBOT-ASSISTED, LAPAROSCOPIC, USING DA ROBERTO XI N/A 4/17/2024    Procedure: LYSIS OF ADHESIONS;  Surgeon: Negin Pink MD;  Location: [facility] OR    TONSILLECTOMY          Allergies:     Allergies   Allergen Reactions    Fish Oil Swelling    Fish Oil Unknown    Fish-Derived Products      Finned-fish (salmon, tuna, Cod fish)     Medications:  Current Facility-Administered Medications   Medication Dose Route Frequency Provider Last Rate Last Admin    [START ON 4/23/2024] piperacillin-tazobactam (ZOSYN) 3.375 g vial to attach to  mL bag  3.375 g Intravenous Q8H Radha Wilson MD         Current Outpatient Medications   Medication Sig Dispense Refill    acetaminophen (TYLENOL) 500 MG tablet Take 1,000 mg by mouth every 8 hours as needed for mild pain      citalopram (CELEXA) 20 MG tablet Take 1 tablet by mouth every morning      estradiol (ESTRACE) 0.1 MG/GM vaginal cream Place vaginally See Admin Instructions APPLY 1/2 GRAM VAGINAL THREE TIMES WEEKLY FOR TWO WEEKS THEN THREE TIMES WEEKLY 90 DAYS      traZODone (DESYREL) 50 MG tablet Take 50 mg by mouth nightly as needed for sleep       Social History:  Social History     Socioeconomic History    Marital status:      Spouse name: Not on file    Number of children: Not on file    Years of education: Not on file    Highest education level: Not on file   Occupational History    Not on file   Tobacco Use    Smoking status: Never     Passive exposure: Never    Smokeless tobacco: Never   Vaping Use    Vaping status: Never Used   Substance and Sexual Activity    Alcohol use: Yes     Comment: 3-5 drinks per week    Drug use: Not Currently    Sexual activity: Not on file   Other Topics Concern    Not on file   Social History Narrative    Not on file     Social Determinants of Health     Financial Resource Strain: Low Risk  (9/24/2023)    Financial Resource Strain     Within the past 12 months, have you or your family members you live with been unable to get utilities (heat, electricity) when it was really needed?: No   Food Insecurity: Low Risk  (9/24/2023)    Food Insecurity     Within the past 12 months, did you worry that  your food would run out before you got money to buy more?: No     Within the past 12 months, did the food you bought just not last and you didn t have money to get more?: No   Transportation Needs: Low Risk  (9/24/2023)    Transportation Needs     Within the past 12 months, has lack of transportation kept you from medical appointments, getting your medicines, non-medical meetings or appointments, work, or from getting things that you need?: No   Physical Activity: Not on file   Stress: Not on file   Social Connections: Not on file   Interpersonal Safety: Low Risk  (3/26/2024)    Interpersonal Safety     Do you feel physically and emotionally safe where you currently live?: Yes     Within the past 12 months, have you been hit, slapped, kicked or otherwise physically hurt by someone?: No     Within the past 12 months, have you been humiliated or emotionally abused in other ways by your partner or ex-partner?: No   Housing Stability: Low Risk  (9/24/2023)    Housing Stability     Do you have housing? : Yes     Are you worried about losing your housing?: No     Family History:  Family History   Problem Relation Age of Onset    Stomach Cancer Sister     No Known Problems Maternal Grandmother        ROS:  12 point review negative except as stated in H&P    Exam:  /63   Pulse 91   Temp (!) 101.8  F (38.8  C) (Temporal)   Resp 20   SpO2 93%   General: Patient sitting up in bed in the hallway in the emergency department.  She actually looks quite comfortable and is moving around easily.  Resp: Non-labored breathing on room air  Cardiac: Mild tachycardia  Abdomen: Soft and nondistended.  Mild tenderness across her lower abdomen.  Definitely not peritonitic.    Labs:   Personally reviewed  WBC 10.6  Lactate 1.0  Creatinine 0.66    Imaging:   Personally reviewed  CT shows fairly large pneumoperitoneum with a fluid collection in the pelvis that abuts both the sigmoid colon and the vaginal cuff. The sigmoid colon  appears to be thickened.     Assessment: 53 year old female presenting with a postoperative complication after robotic hysterectomy.  She has a pelvic fluid collection and large pneumoperitoneum.  The pneumoperitoneum perhaps suggests this is more likely secondary to a colon perforation versus a vaginal cuff dehiscence but cannot be sure either way.  Without amount of free air, I think the patient merits exploration at least with diagnostic laparoscopy.    I discussed my findings with the patient and my recommendation for laparoscopy possible laparotomy.  I see a few different scenarios.  If we find a generally sealed off abscess cavity without free spillage of stool, I would place a drain and be finished.  If it appears to be a problem with the vaginal cuff, I will contact gynecologist.  If there is a large free perforation of the colon that I do not feel will be able to heal on its own, she may require a resection.  Ideally would be nice to do an anastomosis but realistically these often end with a colostomy.  This was explained in detail to the patient and she consents to the procedure.    Plan:   -To the OR this evening for diagnostic laparoscopy possible laparotomy  - Gynecology and hospitalist unwilling to admit the patient.  General surgery will be primary for now.    Chris Rendon MD  General Surgeon  [facility]  [facility]  [address] [place] [address]  [address]  [place] [address]  Office: [phone]

## 2024-04-23 NOTE — ANESTHESIA PROCEDURE NOTES
Airway       Patient location during procedure: OR       Procedure Start/Stop Times: 4/23/2024 1:40 AM  Staff -        CRNA: Seven Olivia APRN CRNA       Performed By: CRNAIndications and Patient Condition       Indications for airway management: morenita-procedural       Induction type:intravenous       Mask difficulty assessment: 0 - not attempted    Final Airway Details       Final airway type: endotracheal airway       Successful airway: ETT - single  Endotracheal Airway Details        ETT size (mm): 7.0       Cuffed: yes       Successful intubation technique: direct laryngoscopy       DL Blade Type: Royal 2       Grade View of Cords: 1       Adjucts: stylet       Position: Center    Post intubation assessment        Placement verified by: capnometry, equal breath sounds and chest rise        Number of attempts at approach: 1       Ease of procedure: easy       Dentition: Unchanged    Medication(s) Administered   Medication Administration Time: 4/23/2024 1:40 AM

## 2024-04-23 NOTE — MEDICATION SCRIBE - ADMISSION MEDICATION HISTORY
Medication Scribe Admission Medication History    Admission medication history is complete. The information provided in this note is only as accurate as the sources available at the time of the update.    Information Source(s): Patient via in-person    Pertinent Information: Patient reports self management of medications     Changes made to PTA medication list:  Added: Tylenol, Estradiol  Deleted: Epipen, Oxycodone  Changed: Celexa from 10 to 20 mg, Trazodone from 100 to 50 mg     Allergies reviewed with patient and updates made in EHR: yes    Medication History Completed By: Kodak López 4/22/2024 10:16 PM    PTA Med List   Medication Sig Last Dose    acetaminophen (TYLENOL) 500 MG tablet Take 1,000 mg by mouth every 8 hours as needed for mild pain 4/22/2024 at am    citalopram (CELEXA) 20 MG tablet Take 1 tablet by mouth every morning 4/22/2024 at am    estradiol (ESTRACE) 0.1 MG/GM vaginal cream Place vaginally See Admin Instructions APPLY 1/2 GRAM VAGINAL THREE TIMES WEEKLY FOR TWO WEEKS THEN THREE TIMES WEEKLY 90 DAYS Past Week at unknown    traZODone (DESYREL) 50 MG tablet Take 50 mg by mouth nightly as needed for sleep 4/21/2024 at pm

## 2024-04-23 NOTE — PLAN OF CARE
Problem: Surgery Nonspecified  Goal: Absence of Bleeding  Outcome: Progressing     Problem: Pain Acute  Goal: Optimal Pain Control and Function  Outcome: Progressing  Intervention: Prevent or Manage Pain  Recent Flowsheet Documentation  Taken 4/23/2024 0415 by Radha Arnold RN  Medication Review/Management: medications reviewed   Goal Outcome Evaluation:       Pt arrived to floor around 0400. NSR. Pt denies pain. VSS. 10ml serosanguinous drainage out of LUQ LAZARO drain. Urinary castelan cath still in place. Pt SBA when up. Pt had loose diarrhea this AM. Temperature back to normal.

## 2024-04-24 VITALS
OXYGEN SATURATION: 97 % | BODY MASS INDEX: 19.97 KG/M2 | WEIGHT: 123.7 LBS | TEMPERATURE: 98.1 F | RESPIRATION RATE: 20 BRPM | HEART RATE: 75 BPM | DIASTOLIC BLOOD PRESSURE: 67 MMHG | SYSTOLIC BLOOD PRESSURE: 110 MMHG

## 2024-04-24 LAB
ANION GAP SERPL CALCULATED.3IONS-SCNC: 9 MMOL/L (ref 7–15)
BUN SERPL-MCNC: 8.4 MG/DL (ref 6–20)
CALCIUM SERPL-MCNC: 8.8 MG/DL (ref 8.6–10)
CHLORIDE SERPL-SCNC: 103 MMOL/L (ref 98–107)
CREAT SERPL-MCNC: 0.61 MG/DL (ref 0.51–0.95)
DEPRECATED HCO3 PLAS-SCNC: 30 MMOL/L (ref 22–29)
EGFRCR SERPLBLD CKD-EPI 2021: >90 ML/MIN/1.73M2
ERYTHROCYTE [DISTWIDTH] IN BLOOD BY AUTOMATED COUNT: 13 % (ref 10–15)
GLUCOSE SERPL-MCNC: 107 MG/DL (ref 70–99)
HCT VFR BLD AUTO: 35.5 % (ref 35–47)
HGB BLD-MCNC: 11.5 G/DL (ref 11.7–15.7)
MCH RBC QN AUTO: 28 PG (ref 26.5–33)
MCHC RBC AUTO-ENTMCNC: 32.4 G/DL (ref 31.5–36.5)
MCV RBC AUTO: 86 FL (ref 78–100)
PLATELET # BLD AUTO: 273 10E3/UL (ref 150–450)
POTASSIUM SERPL-SCNC: 3.8 MMOL/L (ref 3.4–5.3)
RBC # BLD AUTO: 4.11 10E6/UL (ref 3.8–5.2)
SODIUM SERPL-SCNC: 142 MMOL/L (ref 135–145)
WBC # BLD AUTO: 9.7 10E3/UL (ref 4–11)

## 2024-04-24 PROCEDURE — 250N000011 HC RX IP 250 OP 636: Performed by: SURGERY

## 2024-04-24 PROCEDURE — 85027 COMPLETE CBC AUTOMATED: CPT | Performed by: SURGERY

## 2024-04-24 PROCEDURE — 80048 BASIC METABOLIC PNL TOTAL CA: CPT | Performed by: SURGERY

## 2024-04-24 PROCEDURE — 36415 COLL VENOUS BLD VENIPUNCTURE: CPT | Performed by: SURGERY

## 2024-04-24 PROCEDURE — 250N000013 HC RX MED GY IP 250 OP 250 PS 637: Performed by: SURGERY

## 2024-04-24 PROCEDURE — 250N000013 HC RX MED GY IP 250 OP 250 PS 637: Performed by: OBSTETRICS & GYNECOLOGY

## 2024-04-24 RX ORDER — METRONIDAZOLE 500 MG/1
500 TABLET ORAL 2 TIMES DAILY
Qty: 28 TABLET | Refills: 0 | Status: SHIPPED | OUTPATIENT
Start: 2024-04-24 | End: 2024-05-08

## 2024-04-24 RX ORDER — OXYCODONE HYDROCHLORIDE 5 MG/1
5 TABLET ORAL
Status: CANCELLED | OUTPATIENT
Start: 2024-04-24

## 2024-04-24 RX ORDER — OXYCODONE HYDROCHLORIDE 5 MG/1
5 TABLET ORAL EVERY 6 HOURS PRN
Qty: 10 TABLET | Refills: 0 | Status: SHIPPED | OUTPATIENT
Start: 2024-04-24 | End: 2024-04-27

## 2024-04-24 RX ORDER — METRONIDAZOLE 500 MG/1
500 TABLET ORAL 2 TIMES DAILY
Status: DISCONTINUED | OUTPATIENT
Start: 2024-04-24 | End: 2024-04-24 | Stop reason: HOSPADM

## 2024-04-24 RX ORDER — SULFAMETHOXAZOLE/TRIMETHOPRIM 800-160 MG
1 TABLET ORAL 2 TIMES DAILY
Status: DISCONTINUED | OUTPATIENT
Start: 2024-04-24 | End: 2024-04-24 | Stop reason: HOSPADM

## 2024-04-24 RX ORDER — SULFAMETHOXAZOLE/TRIMETHOPRIM 800-160 MG
1 TABLET ORAL 2 TIMES DAILY
Qty: 28 TABLET | Refills: 0 | Status: SHIPPED | OUTPATIENT
Start: 2024-04-24 | End: 2024-05-08

## 2024-04-24 RX ORDER — ACETAMINOPHEN 325 MG/1
975 TABLET ORAL ONCE
Status: CANCELLED | OUTPATIENT
Start: 2024-04-24 | End: 2024-04-24

## 2024-04-24 RX ORDER — IBUPROFEN 200 MG
800 TABLET ORAL ONCE
Status: CANCELLED | OUTPATIENT
Start: 2024-04-24 | End: 2024-04-24

## 2024-04-24 RX ADMIN — ACETAMINOPHEN 975 MG: 325 TABLET ORAL at 04:03

## 2024-04-24 RX ADMIN — PIPERACILLIN AND TAZOBACTAM 3.38 G: 3; .375 INJECTION, POWDER, FOR SOLUTION INTRAVENOUS at 04:13

## 2024-04-24 RX ADMIN — SULFAMETHOXAZOLE AND TRIMETHOPRIM 1 TABLET: 800; 160 TABLET ORAL at 09:47

## 2024-04-24 RX ADMIN — ACETAMINOPHEN 975 MG: 325 TABLET ORAL at 10:55

## 2024-04-24 RX ADMIN — HEPARIN SODIUM 5000 UNITS: 10000 INJECTION, SOLUTION INTRAVENOUS; SUBCUTANEOUS at 04:13

## 2024-04-24 RX ADMIN — CITALOPRAM HYDROBROMIDE 20 MG: 10 TABLET ORAL at 09:46

## 2024-04-24 RX ADMIN — METRONIDAZOLE 500 MG: 500 TABLET ORAL at 09:51

## 2024-04-24 ASSESSMENT — ACTIVITIES OF DAILY LIVING (ADL)
ADLS_ACUITY_SCORE: 38
ADLS_ACUITY_SCORE: 37
ADLS_ACUITY_SCORE: 37
ADLS_ACUITY_SCORE: 38
ADLS_ACUITY_SCORE: 38

## 2024-04-24 NOTE — PLAN OF CARE
Problem: Adult Inpatient Plan of Care  Goal: Readiness for Transition of Care  Outcome: Progressing     Problem: Surgery Nonspecified  Goal: Optimal Pain Control and Function  Outcome: Progressing     Problem: Fall Injury Risk  Goal: Absence of Fall and Fall-Related Injury  Intervention: Promote Injury-Free Environment  Recent Flowsheet Documentation  Taken 4/23/2024 2322 by Eliot Zabala RN  Safety Promotion/Fall Prevention: activity supervised     Pt alert and oriented x 4, vitals stable, On room air, reported mild pain on her J.P. drain insertion site, scheduled tylenol effective. Requested for no bed alarm and education was provided on fall prevention.

## 2024-04-24 NOTE — PROGRESS NOTES
Care Management Discharge Note    Discharge Date: 04/24/2024       Discharge Disposition:  Home     Discharge Services:  per team    Discharge DME:  per team    Discharge Transportation:  Family or friend     Private pay costs discussed: Not applicable    Does the patient's insurance plan have a 3 day qualifying hospital stay waiver?  No    PAS Confirmation Code:  NA  Patient/family educated on Medicare website which has current facility and service quality ratings:NA      Education Provided on the Discharge Plan:  yes  Persons Notified of Discharge Plans: pt, nursing  Patient/Family in Agreement with the Plan:  yes    Handoff Referral Completed: Yes    Additional Information:  Pt medically ready to discharge home. Family/ friend to transport. No CM needs at discharge.     XI Hansen

## 2024-04-24 NOTE — PLAN OF CARE
Goal Outcome Evaluation:         Patient discharging to home in stable condition.  Home instruction provided by discussing information in AVS. LAZARO instruction given.  Written prescriptions given and patient to pick-up at Pharmacy of her choice.  Patient states follow-up appointment made with OB/GYN.

## 2024-04-24 NOTE — PROGRESS NOTES
GYN progress note:  Discussed patient with Dr. Ford who saw her this morning as well.   Met with patient this am and discussed postop coarse.  Pt is doing well and feeling much better after abscess drainage.  She was happy to find out she did not have a bowel perforation.  We discussed going home today and she is very happy about being able to go home.  She will continue to document the discharge from her drain.  She will follow up as planned next Wednesday, offered earlier appointment, but pt would like to keep one week from now.  Will assess for drain removal at that visit.  All questions answered.  Discharged home with 2 weeks antibiotics and precautions.  Pt is having no vaginal discharge and we discussed monitoring for bleeding or fluid leaking.    Negin Pink MD

## 2024-04-24 NOTE — PROGRESS NOTES
GYN Surgery Progress Note      S:  The patient feels well, best she's felt since her hyst.  She is ambulating and tolerating that well. Tolerating a regular diet and passing flatus.  Pain control has been adequate on the oral medication. Would like to go home today.    O:   /67 (BP Location: Left arm)   Pulse 75   Temp 98.1  F (36.7  C) (Oral)   Resp 20   Wt 56.1 kg (123 lb 11.2 oz)   SpO2 97%   BMI 19.97 kg/m            Looks well        Abd:  Soft, ND, tenderness just as expected post op        Incision:  lap incisions healing. Bruising at sites as expected. LAZARO site dressing clean, intact. Small amount (about 10cc) serous drainage in LAZARO          Labs:   Results for orders placed or performed during the hospital encounter of 04/22/24   CT Abdomen Pelvis w Contrast     Status: Abnormal   Result Value Ref Range    Radiologist flags Perforated viscus (AA)     Narrative    EXAM: CT ABDOMEN PELVIS W CONTRAST  LOCATION: [facility]  DATE: 4/22/2024    INDICATION: lower abdominal pain, back pain, fever, status post hysterectomy on 04/17/2024  COMPARISON: None.  TECHNIQUE: CT scan of the abdomen and pelvis was performed following injection of IV contrast. Multiplanar reformats were obtained. Dose reduction techniques were used.  CONTRAST: 64ml isovue 370    FINDINGS:   LOWER CHEST: Normal.    HEPATOBILIARY: Normal.    PANCREAS: Normal.    SPLEEN: Normal.    ADRENAL GLANDS: Normal.    KIDNEYS/BLADDER: No significant mass, stone, or hydronephrosis. There is air within the bladder compatible with recent Romo catheter administration.    BOWEL: Large volume pneumoperitoneum greater than expected postoperative setting. There is a moderate to large complex air and fluid containing collection in the pelvis with 2 communicating pockets. These measure 9.3 x 4.6 cm on series 3 image 158 and   5.8 x 5.1 cm on series 3 image 162. There is a communication between these collections on series 3 image  163. There is diffuse circumferential wall thickening of the rectosigmoid colon. There are also areas of diffuse circumferential wall thickening of   the small bowel in the pelvis. No obstruction. The appendix is not definitively identified.    LYMPH NODES: Normal.    VASCULATURE: Normal.    PELVIC ORGANS: Hysterectomy. The pelvic fluid collection abuts the vaginal cuff.    MUSCULOSKELETAL: Normal.      Impression    IMPRESSION:   1.  Large volume free air greater than expected for recent hysterectomy. In addition, there is a large complex air and fluid containing collection in the pelvis with 2 communicating pockets. These measure 9 x 5 and 6 x 5 cm respectively with peripheral   enhancement. These abut the sigmoid colon and vaginal cuff. Given the large volume free air, a perforated sigmoid colon is favored over vaginal cuff dehiscence. Surgical consultation is recommended.  2.  There is diffuse circumferential wall thickening of several loops of small bowel in the pelvis. These also could be the source of perforation but are more likely related to reactive changes of the complex pelvic fluid collection.      [Critical Result: Perforated viscus]    Finding was identified on 4/22/2024 9:49 PM CDT.     Radha Wilson was contacted by me on 4/22/2024 9:57 PM CDT and verbalized understanding of the critical result.    UA with Microscopic reflex to Culture     Status: Abnormal    Specimen: Urine, Clean Catch   Result Value Ref Range    Color Urine Yellow Colorless, Straw, Light Yellow, Yellow    Appearance Urine Clear Clear    Glucose Urine Negative Negative mg/dL    Bilirubin Urine Negative Negative    Ketones Urine 40 (A) Negative mg/dL    Specific Gravity Urine 1.022 1.001 - 1.030    Blood Urine Negative Negative    pH Urine 6.5 5.0 - 7.0    Protein Albumin Urine 20 (A) Negative mg/dL    Urobilinogen Urine <2.0 <2.0 mg/dL    Nitrite Urine Negative Negative    Leukocyte Esterase Urine Negative Negative    Mucus Urine  Present (A) None Seen /LPF    RBC Urine 1 <=2 /HPF    WBC Urine 1 <=5 /HPF    Hyaline Casts Urine 2 <=2 /LPF    Narrative    Urine Culture not indicated   [place] Draw     Status: None    Narrative    The following orders were created for panel order [place] Draw.  Procedure                               Abnormality         Status                     ---------                               -----------         ------                     Extra Blue Top Tube[[id number]]                              Final result               Extra Red Top Tube[[id number]]                               Final result               Extra Green Top (Lithium...[[id number]]                      Final result               Extra Purple Top Tube[[id number]]                            Final result               Extra Green Top (Lithium...[[id number]]                      Final result                 Please view results for these tests on the individual orders.   Extra Blue Top Tube     Status: None   Result Value Ref Range    Hold Specimen JIC    Extra Red Top Tube     Status: None   Result Value Ref Range    Hold Specimen JIC    Extra Green Top (Lithium Heparin) Tube     Status: None   Result Value Ref Range    Hold Specimen JIC    Extra Purple Top Tube     Status: None   Result Value Ref Range    Hold Specimen JIC    Extra Green Top (Lithium Heparin) ON ICE     Status: None   Result Value Ref Range    Hold Specimen JIC    Lactic acid whole blood     Status: Normal   Result Value Ref Range    Lactic Acid 1.0 0.7 - 2.0 mmol/L   Comprehensive metabolic panel     Status: Abnormal   Result Value Ref Range    Sodium 134 (L) 135 - 145 mmol/L    Potassium 3.8 3.4 - 5.3 mmol/L    Carbon Dioxide (CO2) 27 22 - 29 mmol/L    Anion Gap 11 7 - 15 mmol/L    Urea Nitrogen 14.2 6.0 - 20.0 mg/dL    Creatinine 0.66 0.51 - 0.95 mg/dL    GFR Estimate >90 >60 mL/min/1.73m2    Calcium 9.3 8.6 - 10.0 mg/dL    Chloride 96 (L) 98 - 107 mmol/L    Glucose 115 (H) 70 - 99 mg/dL     Alkaline Phosphatase 67 40 - 150 U/L    AST 22 0 - 45 U/L    ALT 19 0 - 50 U/L    Protein Total 7.1 6.4 - 8.3 g/dL    Albumin 3.7 3.5 - 5.2 g/dL    Bilirubin Total 0.7 <=1.2 mg/dL   CBC with platelets and differential     Status: Abnormal   Result Value Ref Range    WBC Count 10.6 4.0 - 11.0 10e3/uL    RBC Count 4.35 3.80 - 5.20 10e6/uL    Hemoglobin 12.3 11.7 - 15.7 g/dL    Hematocrit 36.8 35.0 - 47.0 %    MCV 85 78 - 100 fL    MCH 28.3 26.5 - 33.0 pg    MCHC 33.4 31.5 - 36.5 g/dL    RDW 12.9 10.0 - 15.0 %    Platelet Count 234 150 - 450 10e3/uL    % Neutrophils 85 %    % Lymphocytes 5 %    % Monocytes 9 %    % Eosinophils 1 %    % Basophils 0 %    % Immature Granulocytes 0 %    NRBCs per 100 WBC 0 <1 /100    Absolute Neutrophils 9.0 (H) 1.6 - 8.3 10e3/uL    Absolute Lymphocytes 0.6 (L) 0.8 - 5.3 10e3/uL    Absolute Monocytes 0.9 0.0 - 1.3 10e3/uL    Absolute Eosinophils 0.1 0.0 - 0.7 10e3/uL    Absolute Basophils 0.0 0.0 - 0.2 10e3/uL    Absolute Immature Granulocytes 0.0 <=0.4 10e3/uL    Absolute NRBCs 0.0 10e3/uL   Basic metabolic panel     Status: Abnormal   Result Value Ref Range    Sodium 142 135 - 145 mmol/L    Potassium 3.8 3.4 - 5.3 mmol/L    Chloride 103 98 - 107 mmol/L    Carbon Dioxide (CO2) 30 (H) 22 - 29 mmol/L    Anion Gap 9 7 - 15 mmol/L    Urea Nitrogen 8.4 6.0 - 20.0 mg/dL    Creatinine 0.61 0.51 - 0.95 mg/dL    GFR Estimate >90 >60 mL/min/1.73m2    Calcium 8.8 8.6 - 10.0 mg/dL    Glucose 107 (H) 70 - 99 mg/dL   CBC with platelets     Status: Abnormal   Result Value Ref Range    WBC Count 9.7 4.0 - 11.0 10e3/uL    RBC Count 4.11 3.80 - 5.20 10e6/uL    Hemoglobin 11.5 (L) 11.7 - 15.7 g/dL    Hematocrit 35.5 35.0 - 47.0 %    MCV 86 78 - 100 fL    MCH 28.0 26.5 - 33.0 pg    MCHC 32.4 31.5 - 36.5 g/dL    RDW 13.0 10.0 - 15.0 %    Platelet Count 273 150 - 450 10e3/uL   CBC with platelets differential     Status: Abnormal    Narrative    The following orders were created for panel order CBC with  platelets differential.  Procedure                               Abnormality         Status                     ---------                               -----------         ------                     CBC with platelets and d...[[id number]]  Abnormal            Final result                 Please view results for these tests on the individual orders.       A:  POD 7  S/P  robotic hysterectomy admitted with postop pelvic abscess, s/p laparoscopy and drainage by general surgery at 0200 4-23-24. No bowel injury noted. On IV Zosyn and Vanco since.Now afebrile over 24hrs, clinically improved.  Am WBC 9.7           P:  Continue routine cares.  Anticipate discharge home. Continue on oral antibiotics with Bactrim DS BID and Flagyl BID x 14days. Will keep LAZARO in place for ongiong drainage. Will record output amounts. Call clinic if LAZARO output less than 30cc in 24hrs; can come in for drain removal.  Instructions discussed.  Has appt with Dr Pink in 1 week (5-1-24)  and 6 weeks for post op checks.      Jacki Ford MD

## 2024-04-24 NOTE — DISCHARGE SUMMARY
GYN  Discharge Summary      Date:  2024    Name:  Luz Sharpe  :  1970  MRN:  [id number]      Admission Date:  2024  Discharge Date:  2024    Principal Diagnosis:    Patient Active Problem List   Diagnosis    Polyp of colon    Benign neoplasm of ascending colon    Sepsis due to other etiology (H)    H/O: hysterectomy    Perforated sigmoid colon (H)    Pneumoperitoneum    Generalized abdominal pain    Perforated viscus    Status post hysterectomy       Procedure(s) Performed:  CT scan, IV antibiotics, laparoscopy with drainage of postop pelvic abscess        Condition at Discharge:  good    Discharge Medications:      Review of your medicines        START taking        Dose / Directions   metroNIDAZOLE 500 MG tablet  Commonly known as: FLAGYL  Indication: Abscess  Used for: Pelvic abscess in female      Dose: 500 mg  Take 1 tablet (500 mg) by mouth 2 times daily for 14 days  Quantity: 28 tablet  Refills: 0     oxyCODONE 5 MG tablet  Commonly known as: ROXICODONE  Used for: Pelvic abscess in female      Dose: 5 mg  Take 1 tablet (5 mg) by mouth every 6 hours as needed for moderate pain  Quantity: 10 tablet  Refills: 0     sulfamethoxazole-trimethoprim 800-160 MG tablet  Commonly known as: BACTRIM DS  Indication: Abscess  Used for: Pelvic abscess in female      Dose: 1 tablet  Take 1 tablet by mouth 2 times daily for 14 days  Quantity: 28 tablet  Refills: 0            CONTINUE these medicines which have NOT CHANGED        Dose / Directions   acetaminophen 500 MG tablet  Commonly known as: TYLENOL      Dose: 1,000 mg  Take 1,000 mg by mouth every 8 hours as needed for mild pain  Refills: 0     citalopram 20 MG tablet  Commonly known as: celeXA      Dose: 1 tablet  Take 1 tablet by mouth every morning  Refills: 0     traZODone 50 MG tablet  Commonly known as: DESYREL      Dose: 50 mg  Take 50 mg by mouth nightly as needed for sleep  Refills: 0            STOP taking      estradiol 0.1 MG/GM  vaginal cream  Commonly known as: ESTRACE                  Where to get your medicines        Some of these will need a paper prescription and others can be bought over the counter. Ask your nurse if you have questions.    Bring a paper prescription for each of these medications  metroNIDAZOLE 500 MG tablet  oxyCODONE 5 MG tablet  sulfamethoxazole-trimethoprim 800-160 MG tablet          Discharge Plan:    Follow up with /CNM:  Dr Pink on 24 as scheduled. Call to come in sooner for LAZARO drain removal when output less than 30cc in 24hrs   Patient Instructions:      Physical activity: as tolerated. Nothing in vagina, and no heavy lifting x 6 weeks    Diet:  regular    Medication: as above    Other:        Physician/CNM: Jacki Ford MD    Name:  Luz Sharpe  :  1970  MRN:  [id number]

## 2024-04-24 NOTE — PLAN OF CARE
Problem: Pain Acute  Goal: Optimal Pain Control and Function  Outcome: Progressing  Intervention: Prevent or Manage Pain  Recent Flowsheet Documentation  Taken 4/23/2024 1630 by Alta Nolan, RN  Medication Review/Management: medications reviewed   Goal Outcome Evaluation:       Pt is alert and oriented x 4, c/o pain in the LAZARO site, scheduled Tylenol given with relief. Lung sounds clear, on RA. Pt is independent in the room. Castelan in place, HS cares and castelan cares done. LAZARO drainage is 13 ml for this shift.

## 2024-04-26 ENCOUNTER — PATIENT OUTREACH (OUTPATIENT)
Dept: CARE COORDINATION | Facility: CLINIC | Age: 54
End: 2024-04-26
Payer: COMMERCIAL

## 2024-04-26 ASSESSMENT — ENCOUNTER SYMPTOMS: FEVER: 1

## 2024-04-26 NOTE — PROGRESS NOTES
[facility]:   [facility] Contact  [facility]/Voicemail     Clinical Data: Post-Discharge Outreach     Outreach attempted x 2.  Left message on patient's voicemail, providing [facility]'s central phone number of [phone] ([phone]) for questions/concerns and/or to schedule an appt with an [facility] provider, if they do not have a PCP.      Plan:  [facility] will do no further outreaches at this time.       Roberta Conde  Community Health Worker  [facility], [facility]  [phone]      *Connected Care Resource Team does NOT follow patient ongoing. Referrals are identified based on internal discharge reports and the outreach is to ensure patient has an understanding of their discharge instructions.

## 2024-04-29 ENCOUNTER — ANESTHESIA EVENT (OUTPATIENT)
Dept: SURGERY | Facility: HOSPITAL | Age: 54
End: 2024-04-29
Payer: COMMERCIAL

## 2024-04-29 ENCOUNTER — ANESTHESIA (OUTPATIENT)
Dept: SURGERY | Facility: HOSPITAL | Age: 54
End: 2024-04-29
Payer: COMMERCIAL

## 2024-04-29 ENCOUNTER — HOSPITAL ENCOUNTER (OUTPATIENT)
Facility: HOSPITAL | Age: 54
Discharge: HOME | End: 2024-04-29
Attending: OBSTETRICS & GYNECOLOGY | Admitting: OBSTETRICS & GYNECOLOGY
Payer: COMMERCIAL

## 2024-04-29 VITALS
RESPIRATION RATE: 16 BRPM | HEART RATE: 91 BPM | OXYGEN SATURATION: 97 % | BODY MASS INDEX: 20.18 KG/M2 | TEMPERATURE: 98.1 F | DIASTOLIC BLOOD PRESSURE: 67 MMHG | WEIGHT: 125 LBS | SYSTOLIC BLOOD PRESSURE: 114 MMHG

## 2024-04-29 DIAGNOSIS — T81.328A DEHISCENCE OF VAGINAL CUFF, INITIAL ENCOUNTER: Primary | ICD-10-CM

## 2024-04-29 LAB
ABO/RH(D): NORMAL
ANTIBODY SCREEN: NEGATIVE
HGB BLD-MCNC: 13.2 G/DL (ref 11.7–15.7)
SPECIMEN EXPIRATION DATE: NORMAL

## 2024-04-29 PROCEDURE — 250N000011 HC RX IP 250 OP 636: Performed by: ANESTHESIOLOGY

## 2024-04-29 PROCEDURE — 85018 HEMOGLOBIN: CPT | Performed by: ANESTHESIOLOGY

## 2024-04-29 PROCEDURE — 250N000009 HC RX 250: Performed by: ANESTHESIOLOGY

## 2024-04-29 PROCEDURE — 258N000003 HC RX IP 258 OP 636: Performed by: ANESTHESIOLOGY

## 2024-04-29 PROCEDURE — 999N000141 HC STATISTIC PRE-PROCEDURE NURSING ASSESSMENT: Performed by: OBSTETRICS & GYNECOLOGY

## 2024-04-29 PROCEDURE — 272N000001 HC OR GENERAL SUPPLY STERILE: Performed by: OBSTETRICS & GYNECOLOGY

## 2024-04-29 PROCEDURE — 360N000075 HC SURGERY LEVEL 2, PER MIN: Performed by: OBSTETRICS & GYNECOLOGY

## 2024-04-29 PROCEDURE — 250N000011 HC RX IP 250 OP 636: Performed by: OBSTETRICS & GYNECOLOGY

## 2024-04-29 PROCEDURE — 36415 COLL VENOUS BLD VENIPUNCTURE: CPT | Performed by: ANESTHESIOLOGY

## 2024-04-29 PROCEDURE — 86900 BLOOD TYPING SEROLOGIC ABO: CPT | Performed by: ANESTHESIOLOGY

## 2024-04-29 PROCEDURE — 710N000012 HC RECOVERY PHASE 2, PER MINUTE: Performed by: OBSTETRICS & GYNECOLOGY

## 2024-04-29 PROCEDURE — 250N000013 HC RX MED GY IP 250 OP 250 PS 637: Performed by: OBSTETRICS & GYNECOLOGY

## 2024-04-29 PROCEDURE — 370N000017 HC ANESTHESIA TECHNICAL FEE, PER MIN: Performed by: OBSTETRICS & GYNECOLOGY

## 2024-04-29 PROCEDURE — 250N000009 HC RX 250: Performed by: OBSTETRICS & GYNECOLOGY

## 2024-04-29 RX ORDER — ONDANSETRON 4 MG/1
4 TABLET, ORALLY DISINTEGRATING ORAL EVERY 30 MIN PRN
Status: DISCONTINUED | OUTPATIENT
Start: 2024-04-29 | End: 2024-04-29 | Stop reason: HOSPADM

## 2024-04-29 RX ORDER — ONDANSETRON 2 MG/ML
4 INJECTION INTRAMUSCULAR; INTRAVENOUS EVERY 30 MIN PRN
Status: DISCONTINUED | OUTPATIENT
Start: 2024-04-29 | End: 2024-04-29 | Stop reason: HOSPADM

## 2024-04-29 RX ORDER — SODIUM CHLORIDE, SODIUM LACTATE, POTASSIUM CHLORIDE, CALCIUM CHLORIDE 600; 310; 30; 20 MG/100ML; MG/100ML; MG/100ML; MG/100ML
INJECTION, SOLUTION INTRAVENOUS CONTINUOUS
Status: DISCONTINUED | OUTPATIENT
Start: 2024-04-29 | End: 2024-04-29 | Stop reason: HOSPADM

## 2024-04-29 RX ORDER — MAGNESIUM SULFATE 4 G/50ML
4 INJECTION INTRAVENOUS ONCE
Status: COMPLETED | OUTPATIENT
Start: 2024-04-29 | End: 2024-04-29

## 2024-04-29 RX ORDER — CEFAZOLIN SODIUM/WATER 2 G/20 ML
2 SYRINGE (ML) INTRAVENOUS
Status: COMPLETED | OUTPATIENT
Start: 2024-04-29 | End: 2024-04-29

## 2024-04-29 RX ORDER — PROPOFOL 10 MG/ML
INJECTION, EMULSION INTRAVENOUS PRN
Status: DISCONTINUED | OUTPATIENT
Start: 2024-04-29 | End: 2024-04-29

## 2024-04-29 RX ORDER — ACETAMINOPHEN 325 MG/1
975 TABLET ORAL ONCE
Status: COMPLETED | OUTPATIENT
Start: 2024-04-29 | End: 2024-04-29

## 2024-04-29 RX ORDER — HYDROMORPHONE HYDROCHLORIDE 1 MG/ML
0.4 INJECTION, SOLUTION INTRAMUSCULAR; INTRAVENOUS; SUBCUTANEOUS EVERY 5 MIN PRN
Status: DISCONTINUED | OUTPATIENT
Start: 2024-04-29 | End: 2024-04-29 | Stop reason: HOSPADM

## 2024-04-29 RX ORDER — FENTANYL CITRATE 50 UG/ML
INJECTION, SOLUTION INTRAMUSCULAR; INTRAVENOUS PRN
Status: DISCONTINUED | OUTPATIENT
Start: 2024-04-29 | End: 2024-04-29

## 2024-04-29 RX ORDER — IBUPROFEN 200 MG
800 TABLET ORAL ONCE
Qty: 4 TABLET | Refills: 0 | Status: DISCONTINUED | OUTPATIENT
Start: 2024-04-29 | End: 2024-04-29 | Stop reason: HOSPADM

## 2024-04-29 RX ORDER — NALOXONE HYDROCHLORIDE 0.4 MG/ML
0.1 INJECTION, SOLUTION INTRAMUSCULAR; INTRAVENOUS; SUBCUTANEOUS
Status: DISCONTINUED | OUTPATIENT
Start: 2024-04-29 | End: 2024-04-29 | Stop reason: HOSPADM

## 2024-04-29 RX ORDER — FENTANYL CITRATE 50 UG/ML
50 INJECTION, SOLUTION INTRAMUSCULAR; INTRAVENOUS EVERY 5 MIN PRN
Status: DISCONTINUED | OUTPATIENT
Start: 2024-04-29 | End: 2024-04-29 | Stop reason: HOSPADM

## 2024-04-29 RX ORDER — GINSENG 100 MG
CAPSULE ORAL PRN
Status: DISCONTINUED | OUTPATIENT
Start: 2024-04-29 | End: 2024-04-29 | Stop reason: HOSPADM

## 2024-04-29 RX ORDER — CEFAZOLIN SODIUM/WATER 2 G/20 ML
2 SYRINGE (ML) INTRAVENOUS SEE ADMIN INSTRUCTIONS
Status: DISCONTINUED | OUTPATIENT
Start: 2024-04-29 | End: 2024-04-29 | Stop reason: HOSPADM

## 2024-04-29 RX ORDER — PROPOFOL 10 MG/ML
INJECTION, EMULSION INTRAVENOUS CONTINUOUS PRN
Status: DISCONTINUED | OUTPATIENT
Start: 2024-04-29 | End: 2024-04-29

## 2024-04-29 RX ORDER — HYDROMORPHONE HYDROCHLORIDE 1 MG/ML
0.2 INJECTION, SOLUTION INTRAMUSCULAR; INTRAVENOUS; SUBCUTANEOUS EVERY 5 MIN PRN
Status: DISCONTINUED | OUTPATIENT
Start: 2024-04-29 | End: 2024-04-29 | Stop reason: HOSPADM

## 2024-04-29 RX ORDER — ONDANSETRON 2 MG/ML
INJECTION INTRAMUSCULAR; INTRAVENOUS PRN
Status: DISCONTINUED | OUTPATIENT
Start: 2024-04-29 | End: 2024-04-29

## 2024-04-29 RX ORDER — FENTANYL CITRATE 50 UG/ML
25 INJECTION, SOLUTION INTRAMUSCULAR; INTRAVENOUS EVERY 5 MIN PRN
Status: DISCONTINUED | OUTPATIENT
Start: 2024-04-29 | End: 2024-04-29 | Stop reason: HOSPADM

## 2024-04-29 RX ORDER — ACETAMINOPHEN 325 MG/1
975 TABLET ORAL ONCE
Status: DISCONTINUED | OUTPATIENT
Start: 2024-04-29 | End: 2024-04-29 | Stop reason: HOSPADM

## 2024-04-29 RX ORDER — LIDOCAINE 40 MG/G
CREAM TOPICAL
Status: DISCONTINUED | OUTPATIENT
Start: 2024-04-29 | End: 2024-04-29 | Stop reason: HOSPADM

## 2024-04-29 RX ORDER — EPHEDRINE SULFATE 50 MG/ML
INJECTION, SOLUTION INTRAMUSCULAR; INTRAVENOUS; SUBCUTANEOUS PRN
Status: DISCONTINUED | OUTPATIENT
Start: 2024-04-29 | End: 2024-04-29

## 2024-04-29 RX ORDER — HALOPERIDOL 5 MG/ML
1 INJECTION INTRAMUSCULAR
Status: DISCONTINUED | OUTPATIENT
Start: 2024-04-29 | End: 2024-04-29 | Stop reason: HOSPADM

## 2024-04-29 RX ADMIN — LIDOCAINE HYDROCHLORIDE 3 ML: 10 INJECTION, SOLUTION EPIDURAL; INFILTRATION; INTRACAUDAL; PERINEURAL at 16:03

## 2024-04-29 RX ADMIN — Medication 10 MG: at 16:13

## 2024-04-29 RX ADMIN — FENTANYL CITRATE 25 MCG: 50 INJECTION INTRAMUSCULAR; INTRAVENOUS at 16:12

## 2024-04-29 RX ADMIN — MAGNESIUM SULFATE HEPTAHYDRATE 4 G: 80 INJECTION, SOLUTION INTRAVENOUS at 13:35

## 2024-04-29 RX ADMIN — ONDANSETRON 4 MG: 2 INJECTION INTRAMUSCULAR; INTRAVENOUS at 16:06

## 2024-04-29 RX ADMIN — PROPOFOL 30 MG: 10 INJECTION, EMULSION INTRAVENOUS at 16:13

## 2024-04-29 RX ADMIN — Medication 2 G: at 15:56

## 2024-04-29 RX ADMIN — FENTANYL CITRATE 25 MCG: 50 INJECTION INTRAMUSCULAR; INTRAVENOUS at 16:29

## 2024-04-29 RX ADMIN — FENTANYL CITRATE 25 MCG: 50 INJECTION INTRAMUSCULAR; INTRAVENOUS at 16:35

## 2024-04-29 RX ADMIN — FENTANYL CITRATE 25 MCG: 50 INJECTION INTRAMUSCULAR; INTRAVENOUS at 16:03

## 2024-04-29 RX ADMIN — Medication 5 MG: at 16:35

## 2024-04-29 RX ADMIN — PROPOFOL 20 MG: 10 INJECTION, EMULSION INTRAVENOUS at 16:35

## 2024-04-29 RX ADMIN — Medication 5 MG: at 16:31

## 2024-04-29 RX ADMIN — ACETAMINOPHEN 975 MG: 325 TABLET ORAL at 13:35

## 2024-04-29 RX ADMIN — PROPOFOL 20 MG: 10 INJECTION, EMULSION INTRAVENOUS at 16:07

## 2024-04-29 RX ADMIN — PROPOFOL 150 MCG/KG/MIN: 10 INJECTION, EMULSION INTRAVENOUS at 16:06

## 2024-04-29 RX ADMIN — PROPOFOL 50 MG: 10 INJECTION, EMULSION INTRAVENOUS at 16:03

## 2024-04-29 RX ADMIN — Medication 5 MG: at 16:20

## 2024-04-29 RX ADMIN — SODIUM CHLORIDE, POTASSIUM CHLORIDE, SODIUM LACTATE AND CALCIUM CHLORIDE: 600; 310; 30; 20 INJECTION, SOLUTION INTRAVENOUS at 13:35

## 2024-04-29 RX ADMIN — MIDAZOLAM 2 MG: 1 INJECTION INTRAMUSCULAR; INTRAVENOUS at 16:13

## 2024-04-29 ASSESSMENT — ACTIVITIES OF DAILY LIVING (ADL)
ADLS_ACUITY_SCORE: 35
ADLS_ACUITY_SCORE: 36

## 2024-04-29 NOTE — H&P
Preop H and P  Chief Complaint(s):post op 24, drainage of abdominal abscess - had hysterectomy 24, Feels much better, no fever, sm,cma.   HPI:     *GeneralPost op robotic hysterectomy  and abcess drainage one week ago.  follow up friday for vaginal fluid discharge. In clinic on Friday noted vaginal mucosa opening with intact tissue below.  follow up today with continued vaginal fluid drainage.  now on exam with 8mm defect in cuff at midline.     Current Medication:    TakingmetroNIDAZOLE 500 MG Tablet 1 tablet Orally Twice a day X 14 days.Sulfamethoxazole-TMP DS , Notes to Pharmacist: 1 tablet po BID X 14 days.CeleXA , Notes to Pharmacist: 25 mg.traZODone HCl , Notes to Pharmacist: 100 mg.EPINEPHrine 0.3 MG/0.3ML Solution Prefilled Syringe as directed Injection PRN.valACYclovir HCl 500 MG Tablet TAKE 1 TABLET BY MOUTH EVERY DAY , Notes to Pharmacist: PRN.    Not-TakingEstrace 0.1 MG/GM Cream 0.5 grams Vaginal three times weekly for two weeks then three times weekly.    DiscontinuedoxyCODONE HCl 5 MG Capsule 1-2 capsules as needed Orally every 4 hrs.Multivitamin.Spironolactone 25 MG Tablet 1 tablet Orally.Zinc 25 MG Tablet 1 tablet Orally Once a day.Medication List reviewed and reconciled with the patient.   Medical History:Chicken Pox, Bleeding problem, Depression\Anxeity, Abnormal pap, Bladder Infections.   Allergies/Intolerance:Fish oil: Allergy  All Fish: Allergy     Gyn History:Date of Last Period: Hysterectomy with bilateral salpingectomy. Birth Control:  Hysterectomy. Sexual Activity Currently sexually active. Sexually Tranmitted Disease (STD) None. Abnormal Pap Smear [id number]. Colposcopy Never.    OB History:GPAL: . Pregnancy # 1: , normal spontaneous vaginal delivery ().    Surgical History:Ear surgeries as a child , Tonsillectomy , Tubal Ligation , Robotic assisted total laparoscopic hysterectomy, bilateral salpingectomy, cystoscopy 2024, Intra-abdominal abscess  2024   Hospitalization:Fever, chills, back pain - abdominal abcess 2024   Family History:Siblings: Sister diagnosed with stage 4 stomach cancerMother: diagnosed with HypertensionMaternal Grand Mother: diagnosed with DiabetesMaternal aunt: diagnosed with Breast Cancer, Ovarian cystFather: diagnosed with Hypertension   Social History:     Tobacco Use:Tobacco Use/Smoking:   Are you a  nonsmoker.      Drugs/Alcohol:Drugs:   Have you used drugs other than those for medical reasons in the past 12 months?  No. Caffeine:   Intake:  1-2 cups per day. Do you smoke marijuana?:  Denies. Do you drink alcohol?:  Yes.     Miscellaneous:Domestic violence:  no.  Exercise:  yes 5X/weekly-not after surgery. Sexual abuse:  no.  Verbal abuse:  no.     ROS:Objective:   Vitals:Ht: 68 in, Wt: 122.0 lbs, BP: 100/70 mm Hg, Wt-k.34 kg, BMI: 18.55 Index.   Past Results:   Examination:     *General ExaminationHEART: no murmurs, regular rate and rhythm, S1, S2 normal. LUNGS: clear to auscultation bilaterally. ABDOMEN: normal, bowel sounds present, soft, nontender, nondistended drain in place with serosagninous fluid 10cc over 24 hours. GYN: 3cm vaginal cuff with mucosa open, midline 8mm defect noted between sutures, clear fluid noted at cuff.  no puss noted.    Physical Examination:Assessment:   Assessment:  Dehiscence of vaginal cuff, initial encounter - T81.31XA (Primary)Plan:   Treatment:     Dehiscence of vaginal cuff, initial encounter   Notes: Discussed options for continued observation vs surgery to repair defect. pt would like to go to surgery to repair defect.surgery schduled for this afternoon.cleared for surgery.   Procedures:   Immunizations:   Therapeutic Injections:   Diagnostic Imaging:   Lab Reports:   Procedure Orders:   Preventive Medicine:     YOUR PREVENTIVE WELLNESS PLAN: Breast Cancer Screening (Mammogram): My last mammogram was done on:  2023 Negative. Cervical Cancer Screening (Pap Smear): My last  Pap smear was done on:  04/20/2023 LGSIL. Osteoporosis Screening (Bone Density Measurement): My last bone density was done on:  Never. Colorectal Cancer Screening: Last Done Colonoscopy  11/01/2021. Depression Screening: Screening for depression was last done on:  04/20/2023.    Next Appointment:1 Week,2 - 3 DaysBilling Information:   Visit Code:   Procedure Codes: Post op service LVRS min 6.

## 2024-04-29 NOTE — ANESTHESIA PREPROCEDURE EVALUATION
Anesthesia Pre-Procedure Evaluation    Patient: Luz Sharpe   MRN: [id number] : 1970        Procedure : Procedure(s):  REPAIR, LACERATION, VAGINA cuff dehiscence and  possible laparoscopy          Past Medical History:   Diagnosis Date     Cancer (H)     squamous     History of colonic polyps      Ovarian cyst      PMB (postmenopausal bleeding)       Past Surgical History:   Procedure Laterality Date     HYSTERECTOMY, ROBOT-ASSISTED, USING DA ROBERTO XI, WITH SALPINGO-OOPHORECTOMY, CYSTOSCOPY Bilateral 2024    Procedure: ROBOTIC ASSISTED LAPAROSCOPIC HYSTERECTOMY, BILATERAL SALPINGO-OOPHORECTOMY,  CYSTOSCOPY;  Surgeon: Negin Pink MD;  [place] [facility] [place]     IR FINE NEEDLE ASPIRATION W ULTRASOUND  11/10/2023     LAPAROSCOPY DIAGNOSTIC (GYN) N/A 2024    Procedure: DIAGNOSTIC LAPAROSCOPY AND DRAINAGE OF INTRA-ABDOMINAL ABCESS;  Surgeon: Chris Rendon MD;  Location[place] [facility] [place]     LYSIS, ADHESIONS, ROBOT-ASSISTED, LAPAROSCOPIC, USING DA ROBERTO XI N/A 2024    Procedure: LYSIS OF ADHESIONS;  Surgeon: Negin Pink MD;  [place] [facility] [place]     TONSILLECTOMY        Allergies   Allergen Reactions     Fish Oil Swelling     Fish Oil Unknown     Fish-Derived Products      Finned-fish (salmon, tuna, Cod fish)      Social History     Tobacco Use     Smoking status: Never     Passive exposure: Never     Smokeless tobacco: Never   Substance Use Topics     Alcohol use: Yes     Comment: 3-5 drinks per week      Wt Readings from Last 1 Encounters:   24 56.7 kg (125 lb)        Anesthesia Evaluation   Pt has had prior anesthetic.     No history of anesthetic complications       ROS/MED HX  ENT/Pulmonary:  - neg pulmonary ROS     Neurologic:  - neg neurologic ROS     Cardiovascular:  - neg cardiovascular ROS     METS/Exercise Tolerance: >4 METS    Hematologic:  - neg hematologic  ROS     Musculoskeletal: Comment:   Dehiscence of vaginal cuff      GI/Hepatic: Comment:  "post op 4/23/24, drainage of abdominal abscess - had hysterectomy 4/19/24  Now presents with dehiscence of vaginal cuff   (-) GERD   Renal/Genitourinary:  - neg Renal ROS     Endo:  - neg endo ROS     Psychiatric/Substance Use:       Infectious Disease:       Malignancy:       Other:            Physical Exam    Airway        Mallampati: I   TM distance: > 3 FB   Neck ROM: full   Mouth opening: > 3 cm    Respiratory Devices and Support         Dental     Comment: Good        Cardiovascular   cardiovascular exam normal          Pulmonary   pulmonary exam normal            OUTSIDE LABS:  CBC:   Lab Results   Component Value Date    WBC 9.7 04/24/2024    WBC 10.6 04/22/2024    HGB 13.2 04/29/2024    HGB 11.5 (L) 04/24/2024    HCT 35.5 04/24/2024    HCT 36.8 04/22/2024     04/24/2024     04/22/2024     BMP:   Lab Results   Component Value Date     04/24/2024     (L) 04/22/2024    POTASSIUM 3.8 04/24/2024    POTASSIUM 3.8 04/22/2024    CHLORIDE 103 04/24/2024    CHLORIDE 96 (L) 04/22/2024    CO2 30 (H) 04/24/2024    CO2 27 04/22/2024    BUN 8.4 04/24/2024    BUN 14.2 04/22/2024    CR 0.61 04/24/2024    CR 0.66 04/22/2024     (H) 04/24/2024     (H) 04/22/2024     COAGS:   Lab Results   Component Value Date    PTT 27 04/08/2024    INR 1.17 (H) 03/26/2024     POC: No results found for: \"BGM\", \"HCG\", \"HCGS\"  HEPATIC:   Lab Results   Component Value Date    ALBUMIN 3.7 04/22/2024    PROTTOTAL 7.1 04/22/2024    ALT 19 04/22/2024    AST 22 04/22/2024    ALKPHOS 67 04/22/2024    BILITOTAL 0.7 04/22/2024     OTHER:   Lab Results   Component Value Date    LACT 1.0 04/22/2024    PRASAD 8.8 04/24/2024    TSH 1.43 11/09/2022    T4 1.11 11/09/2022       Anesthesia Plan    ASA Status:  2    NPO Status:  Will be NPO Appropriate at ... (Pt had toast at 0700 and coffee with cream [id number].) 4/29/2024 3:00 PM   Anesthesia Type: General.     - Airway: ETT   Induction: Intravenous, Propofol, RSI (RSI with " rocuronium).   Maintenance: TIVA.        Consents    Anesthesia Plan(s) and associated risks, benefits, and realistic alternatives discussed. Questions answered and patient/representative(s) expressed understanding.     - Discussed: Risks, Benefits and Alternatives for BOTH SEDATION and the PROCEDURE were discussed     - Discussed with:  Patient      - Extended Intubation/Ventilatory Support Discussed: No.      - Patient is DNR/DNI Status: No     Use of blood products discussed: Yes.     - Discussed with: Patient.     - Consented: consented to blood products     Postoperative Care    Pain management: Multi-modal analgesia.   PONV prophylaxis: Ondansetron (or other 5HT-3), Dexamethasone or Solumedrol     Comments:    Other Comments: TIVA    Magnesium  Prn precedex             Patty Jones MD    I have reviewed the pertinent notes and labs in the chart from the past 30 days and (re)examined the patient.  Any updates or changes from those notes are reflected in this note.     # Hyponatremia: Lowest Na = 134 mmol/L in last 30 days, will monitor as appropriate

## 2024-04-29 NOTE — OP NOTE
[facility] Brief Operative Note    Pre-operative diagnosis: Dehiscence of vaginal cuff, initial encounter [T81.31XA]   Post-operative diagnosis * No post-op diagnosis entered *   Procedure: Procedure(s):  vaginal cuff dehiscence repair   Surgeon: Negin Pink MD   Assistants(s): Marlene Colón   Estimated blood loss: Minimal    Specimens: None   Findings: Cuff dehiscence with 1cm defect          [facility]        PROCEDURE:  The patient was brought to the operating room and after induction of MAC anesthesia was prepped and draped in the dorsal lithotomy position. A time out was called and the patient and the procedure were verified.   Patient was placed in steep trendelinberg.    A right angle retractor was placed anterior and posterior into the vagina and the vaginal cuff was visualized.  A long allis was placed on the anterior aspect and posterior aspect of the cuff defect and suction was used to visualize the full defect.  No bowel noted in or near the defect.  O vicryl was used to place interupted suture to close the defect.  Good closure noted at the end of the case.  Patient tolerated surgery and all interments and needles were counted and accounted for.       Negin Pink MD

## 2024-04-29 NOTE — ANESTHESIA POSTPROCEDURE EVALUATION
Patient: Luz Sharpe    Procedure: Procedure(s):  vaginal cuff dehiscence repair       Anesthesia Type:  MAC    Note:  Disposition: Outpatient   Postop Pain Control: Uneventful            Sign Out: Well controlled pain   PONV: No   Neuro/Psych: Uneventful            Sign Out: Acceptable/Baseline neuro status   Airway/Respiratory: Uneventful            Sign Out: Acceptable/Baseline resp. status   CV/Hemodynamics: Uneventful            Sign Out: Acceptable CV status; No obvious hypovolemia; No obvious fluid overload   Other NRE: NONE   DID A NON-ROUTINE EVENT OCCUR? No           Last vitals:  Vitals Value Taken Time   /57 04/29/24 1700   Temp 36.5  C (97.7  F) 04/29/24 1654   Pulse 91 04/29/24 1705   Resp 16 04/29/24 1700   SpO2 96 % 04/29/24 1705   Vitals shown include unfiled device data.    Electronically Signed By: Rodrigo Royal MD  April 29, 2024  5:07 PM

## 2024-04-29 NOTE — PROGRESS NOTES
Work Note:    To whom it may concern,    Luz Sharpe had surgery on 4/17 and again on 4/22 and again today 4/29.  She will be evaluated again on Monday May 6, 2024.  She will unable to return to work until after that evaluation.  She will need light duty for 8 weeks after she returns to work.      Sincerely,    Negin Pink MD

## 2024-04-29 NOTE — ANESTHESIA CARE TRANSFER NOTE
Patient: Luz Sharpe    Procedure: Procedure(s):  vaginal cuff dehiscence repair       Diagnosis: Dehiscence of vaginal cuff, initial encounter [T81.31XA]  Diagnosis Additional Information: No value filed.    Anesthesia Type:   General     Note:    Oropharynx: oropharynx clear of all foreign objects  Level of Consciousness: awake  Oxygen Supplementation: face mask and room air    Independent Airway: airway patency satisfactory and stable  Dentition: dentition unchanged  Vital Signs Stable: post-procedure vital signs reviewed and stable  Report to RN Given: handoff report given  Patient transferred to: Phase II  Comments: vss  Handoff Report: Identifed the Patient, Identified the Reponsible Provider, Reviewed the pertinent medical history, Discussed the surgical course, Reviewed Intra-OP anesthesia mangement and issues during anesthesia, Set expectations for post-procedure period and Allowed opportunity for questions and acknowledgement of understanding      Vitals:  Vitals Value Taken Time   BP     Temp     Pulse 94 04/29/24 1657   Resp     SpO2 98 % 04/29/24 1657   Vitals shown include unfiled device data.    Electronically Signed By: JOANNE Guy CRNA  April 29, 2024  4:59 PM

## 2024-06-01 ENCOUNTER — LAB (OUTPATIENT)
Dept: LAB | Facility: CLINIC | Age: 54
End: 2024-06-01
Payer: COMMERCIAL

## 2024-06-01 DIAGNOSIS — R79.1 ELEVATED INR: ICD-10-CM

## 2024-06-01 DIAGNOSIS — D68.2 FACTOR VII DEFICIENCY (H): Primary | ICD-10-CM

## 2024-06-01 PROCEDURE — 36415 COLL VENOUS BLD VENIPUNCTURE: CPT

## 2024-06-01 PROCEDURE — 85230 CLOT FACTOR VII PROCONVERTIN: CPT

## 2024-06-03 ENCOUNTER — MYC MEDICAL ADVICE (OUTPATIENT)
Dept: PEDIATRICS | Facility: CLINIC | Age: 54
End: 2024-06-03
Payer: COMMERCIAL

## 2024-06-03 LAB — FACT VII ACT/NOR PPP: 25 % (ref 50–129)

## 2024-06-03 NOTE — TELEPHONE ENCOUNTER
[facility].  Lourdes Orourke, ANGELA      Hi     I'm sorry to see you had surgical complications!     Your factor 7 is low, which explains the elevated INR. I've reached out to the hematologist who did your econsult to find out next steps. If you don't hear from me by next week please let me know so I can reach out to him again.     To reply, you will need to start another message as you can't reply to result notes.     Vijaya Wells FNP-PEYTON.   Written by JAONNE Dimas CNP on 6/3/2024 11:08 AM CDT  Seen by patient Luz LOPEZ Annemarie on 6/3/2024 11:12 AM

## 2024-06-16 ENCOUNTER — HEALTH MAINTENANCE LETTER (OUTPATIENT)
Age: 54
End: 2024-06-16

## 2024-07-09 ENCOUNTER — OFFICE VISIT (OUTPATIENT)
Dept: HEMATOLOGY | Facility: CLINIC | Age: 54
End: 2024-07-09
Attending: INTERNAL MEDICINE
Payer: COMMERCIAL

## 2024-07-09 VITALS
HEART RATE: 68 BPM | HEIGHT: 66 IN | BODY MASS INDEX: 20.3 KG/M2 | TEMPERATURE: 97.9 F | OXYGEN SATURATION: 99 % | SYSTOLIC BLOOD PRESSURE: 128 MMHG | DIASTOLIC BLOOD PRESSURE: 76 MMHG | WEIGHT: 126.3 LBS

## 2024-07-09 DIAGNOSIS — R79.1 ELEVATED INR: ICD-10-CM

## 2024-07-09 DIAGNOSIS — D68.2 FACTOR VII DEFICIENCY (H): ICD-10-CM

## 2024-07-09 PROCEDURE — 99213 OFFICE O/P EST LOW 20 MIN: CPT | Performed by: INTERNAL MEDICINE

## 2024-07-09 PROCEDURE — 99205 OFFICE O/P NEW HI 60 MIN: CPT | Performed by: INTERNAL MEDICINE

## 2024-07-09 NOTE — PROGRESS NOTES
[facility]  [facility]  [facility], [address], [place] [address]  Main: [phone], Fax: [phone]          Outpatient Clinic Visit  Date:  07/09/2024      Luz Sharpe is a 53-year-old woman referred for further discussion regarding recent diagnosis of factor VII deficiency.    In March 2024, she was noted to have a mildly prolonged INR of 1.17 (normal range 0.85-1.15).  In hindsight, her INR was also mildly prolonged at 1.18 in November 2023.  PTT is normal.  This prompted assessment of her factor VII level which was found to be 25% (normal range %).  Other than these two INR values from March 2024 and November 2023, we have no previous values for comparison in our system or in the available outside records.    The first INR check in November 2023 was done prior to thyroid biopsy.  Apparently it was not out of range enough to cause concern and she went on to have the biopsy without any bleeding complications.  Other previous hemostatic challenges include wisdom teeth extraction at age 17 or 18, tonsillectomy at age 19, and vaginal delivery at age 30, none of which were complicated by bleeding.  However, in April 2024 she underwent a laparoscopic hysterectomy and although there was no excess bleeding noted at that time, in hindsight, she had a very extensive bruise around one of the incisions.  She showed me pictures of this on her phone and the bruising was indeed in excess of what would be expected.  The hysterectomy procedure was complicated by abscess formation likely related to vaginal cuff dehiscence.  This required a repeat laparoscopic procedure that was not complicated by any bleeding, and a follow-up procedure to repair the vaginal cuff dehiscence which was also not complicated by bleeding.    She does endorse a history of bruising which in hindsight likely seems a bit excessive.  She does not have a history of unusual epistaxis  or any other bleeding symptoms such as blood in her urine or stool, or dental bleeding.  She describes her menstrual periods as being heavy and associated with passage of large clots, and she does recall being treated with oral iron in the past as well.  Her menstrual bleeding was much less problematic after starting oral contraceptives which she took for many years.    She is otherwise in good overall health.  Her only medications are citalopram 20 mg daily, and trazodone 50 mg at bedtime as needed.    Family history: She has 3 sisters, 1 of whom recently passed away from some form of cancer.  None of her sisters have any history of bleeding tendency.  She has a large extended family on both paternal and particularly the maternal side, and there is no known bleeding tendency in any of her aunts, uncles, or first cousins.  She has one son, age 23, who does not have any known bleeding tendency.    Physical exam: She appears to be in good overall health.  No obvious bruises were noted.  More detailed exam not performed today.    Labs: Discussed above.      ASSESSMENT / PLAN:  Factor VII deficiency.  Luz was recently noted to have a mildly prolonged INR and subsequent evaluation revealed a factor VII level of 25%.  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  She has had a number of hemostatic challenges which have not been complicated by bleeding although she did develop excessive bruising around one of the incisions from her recent laparoscopic hysterectomy procedure.    For further confirmation of this diagnosis, we discussed genetic testing.  She was agreeable to having this done as part of the ATHN10 study, and we will contact her once we have those results.    In terms of management, we discussed the availability of factor VII concentrate (NovoSeven) to increase her factor VII level if needed.  We also discussed the use of antifibrinolytic agents.  She does not  require treatment on a regular basis and given her past history would likely not need anything more than perhaps antifibrinolytic therapy following most invasive procedures.  Where she to need a more extensive or higher bleeding risk surgical procedure or have unexpected bleeding from another cause (e.g. GI tract bleeding), then factor VII replacement therapy would be appropriate.    She was provided our contact information and encouraged to call with any new questions or concerns, and prior to any planned surgical or invasive procedures.  Again, we will contact her once we have the results of her genetic testing.    Total time on date of encounter 60 minutes, including review of medical records and labs, clinic visit, and documentation.      Edison Cordova MD  Professor of Medicine  Division of Hematology, Oncology, and Transplantation  Director, [facility]

## 2024-09-25 NOTE — PROCEDURE: MOHS SURGERY
reinserted Subsequent Stages Histo Method Verbiage: Using a similar technique to that described above, a thin layer of tissue was removed from all areas where tumor was visible on the previous stage.  The tissue was again oriented, mapped, dyed, and processed as above.

## 2024-11-26 ENCOUNTER — TELEPHONE (OUTPATIENT)
Dept: HEMATOLOGY | Facility: CLINIC | Age: 54
End: 2024-11-26
Payer: COMMERCIAL

## 2024-11-26 NOTE — TELEPHONE ENCOUNTER
[id number]  Luz Sharpe  53 year old female  CBCD Diagnosis: factor VII deficiency  CBCD Provider: Dr. Cordova    Call received from Luz requesting written clearance be sent to her women's health provider at [facility] in [place] stating that it's ok for her to use the estrogen patch.    River states that she started an estrogen patch rx'd by Dr. Arauz about 6 weeks ago. Her starting dose was 0.025mg and Dr. Arauz is planning to increase her dose to 0.05mg. She is getting this Rx to help with post menopausal symptoms.    Staff reviewed with Luz that her factor VII deficiency would be related to a concern for bleeding and the estrogen would unlikely be an issue as she does not have a person history of clotting. River states that Dr. Arauz is still wanting clearance from Dr. Cordova.    A note with clearance from Dr. Cordova can be faxed to Dr. Arauz at [facility]. Luz would like a [employer] message letting her know when this has been done.    Candice SOSAN, RN, PHN   [facility]   Office: [phone]  Fax: [phone]     Addendum 11/27/2024 3:07 PM:  Verbal order from Dr. Cordova, see documentation only encounter. RN called [facility] and gave verbal okay for her to increase estrogen dose. If they need a written note faxed over, RN asked them to call writer back and leave fax number.     RN updated patient. If she has any problems getting this estrogen dose changed, she will call writer back.      Merna Ho RN, BSN, PCCN  Nurse Clinician    [facility]  [address] [place] [address], [address], [place] [address]   Office, direct: [phone]  Main office number: [phone]  Pronouns: She, her, hers

## 2024-11-27 ENCOUNTER — DOCUMENTATION ONLY (OUTPATIENT)
Dept: HEMATOLOGY | Facility: CLINIC | Age: 54
End: 2024-11-27
Payer: COMMERCIAL

## 2024-11-27 NOTE — PROGRESS NOTES
[id number]  Luz Sharpe  53 year old female  CBCD Diagnosis: Factor VII Deficiency  CBCD Provider: Dr. Cordova    From a hematology perspective, we are not opposed to patient using estrogen hormone replacement therapy.    Edison Cordova MD  Professor of Medicine  Division of Hematology, Oncology, and Transplantation  Director, [facility]

## 2025-05-13 ENCOUNTER — MYC MEDICAL ADVICE (OUTPATIENT)
Dept: HEMATOLOGY | Facility: CLINIC | Age: 55
End: 2025-05-13
Payer: COMMERCIAL

## 2025-05-13 ENCOUNTER — TELEPHONE (OUTPATIENT)
Dept: HEMATOLOGY | Facility: CLINIC | Age: 55
End: 2025-05-13
Payer: COMMERCIAL

## 2025-05-13 DIAGNOSIS — D68.2 FACTOR VII DEFICIENCY (H): Primary | ICD-10-CM

## 2025-05-13 NOTE — TELEPHONE ENCOUNTER
5/13/2025    Referring Provider:  Dr. Cordova    Presenting Information:   I called Luz Sharpe from the [facility] today to discuss her genetic testing results from the [employer] research study called ATHN10. Luz participated in this study because of her personal history of mild Factor VII deficiency.    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 We did not discuss these findings in detail today.     Interpretation and Discussion:   A heterozygous likely pathogenic mutation was identified in the F7 gene. We reviewed that hereditary Factor VII deficiency typically follows autosomal recessive inheritance. Individuals with an autosomal recessive condition have a mutation within both copies of a gene (two mutations, one that was inherited from each parent). We discussed that Factor VII deficiency is an understudied condition and dominant inheritance of Factor VII deficiency has been reported. We reviewed that the finding of a single clinically significant mutation in Luz provides a partial or possibly complete explanation for her personal history of mild Factor VII deficiency.     Summary:  Luz was found to have a heterozygous likely pathogenic mutation in the F7 gene. Luz is currently following with Dr. Cordova at the [facility]. She should continue to follow Dr. Cordova's recommendations for medical management.    Approximate time spent on the phone: 10 minutes.    Gabriela Benavidez, PhD, MS, [facility]  Genetic Counselor

## 2025-06-11 ENCOUNTER — APPOINTMENT (OUTPATIENT)
Dept: URBAN - METROPOLITAN AREA CLINIC 260 | Age: 55
Setting detail: DERMATOLOGY
End: 2025-06-11

## 2025-06-11 VITALS — WEIGHT: 140 LBS | HEIGHT: 68 IN

## 2025-06-11 DIAGNOSIS — D18.0 HEMANGIOMA: ICD-10-CM

## 2025-06-11 DIAGNOSIS — Z71.89 OTHER SPECIFIED COUNSELING: ICD-10-CM

## 2025-06-11 DIAGNOSIS — L82.0 INFLAMED SEBORRHEIC KERATOSIS: ICD-10-CM

## 2025-06-11 DIAGNOSIS — D22 MELANOCYTIC NEVI: ICD-10-CM

## 2025-06-11 DIAGNOSIS — L82.1 OTHER SEBORRHEIC KERATOSIS: ICD-10-CM

## 2025-06-11 DIAGNOSIS — L81.4 OTHER MELANIN HYPERPIGMENTATION: ICD-10-CM

## 2025-06-11 PROBLEM — D18.01 HEMANGIOMA OF SKIN AND SUBCUTANEOUS TISSUE: Status: ACTIVE | Noted: 2025-06-11

## 2025-06-11 PROBLEM — D22.5 MELANOCYTIC NEVI OF TRUNK: Status: ACTIVE | Noted: 2025-06-11

## 2025-06-11 PROCEDURE — OTHER COUNSELING: OTHER

## 2025-06-11 PROCEDURE — OTHER MIPS QUALITY: OTHER

## 2025-06-11 PROCEDURE — 17110 DESTRUCT B9 LESION 1-14: CPT

## 2025-06-11 PROCEDURE — 99213 OFFICE O/P EST LOW 20 MIN: CPT | Mod: 25

## 2025-06-11 PROCEDURE — OTHER LIQUID NITROGEN: OTHER

## 2025-06-11 ASSESSMENT — LOCATION SIMPLE DESCRIPTION DERM
LOCATION SIMPLE: UPPER BACK
LOCATION SIMPLE: LOWER BACK
LOCATION SIMPLE: CHEST

## 2025-06-11 ASSESSMENT — LOCATION DETAILED DESCRIPTION DERM
LOCATION DETAILED: INFERIOR THORACIC SPINE
LOCATION DETAILED: LEFT LATERAL SUPERIOR CHEST
LOCATION DETAILED: SUPERIOR LUMBAR SPINE

## 2025-06-11 ASSESSMENT — LOCATION ZONE DERM: LOCATION ZONE: TRUNK

## 2025-06-21 ENCOUNTER — HEALTH MAINTENANCE LETTER (OUTPATIENT)
Age: 55
End: 2025-06-21

## (undated) DEVICE — SOLUTION IV 2B0304X STRL WATER 1000ML

## (undated) DEVICE — SURGICEL POWDER ABSORBABLE HEMOSTAT 3GM 3013SP

## (undated) DEVICE — GLOVE BIOGEL PI ULTRATOUCH G SZ 7.5 42175

## (undated) DEVICE — MAT FLOOR SURGICAL 40X38 0702140238

## (undated) DEVICE — KOH COLPOTOMIZER OCCLUDER  CPO-6

## (undated) DEVICE — ANTIFOG SOLUTION SEE SHARP 150M TROCAR SWABS 30978

## (undated) DEVICE — DRAPE SHEET TABLE COVER KC 42301*

## (undated) DEVICE — DECANTER VIAL 2006S

## (undated) DEVICE — SU MONOCRYL+ 4-0 18IN PS2 UND MCP496G

## (undated) DEVICE — DAVINCI XI SEAL UNIVERSAL 5-12MM 470500

## (undated) DEVICE — DRSG STERI STRIP 1/2X4" R1547

## (undated) DEVICE — SUTURE VICRYL+ 0 27IN CT-1 UND VCP260H

## (undated) DEVICE — DRAIN RESERVOIR 100ML JP 0070740

## (undated) DEVICE — DAVINCI XI DRAPE COLUMN 470341

## (undated) DEVICE — CATH FOLEY 16FR 5ML LUBRICATH LATEX 0165L16

## (undated) DEVICE — GLOVE PI ULTRATCH M LF SZ 6.5 PF CUFF TEXT STRL LF 42665

## (undated) DEVICE — SUTURE PDS 0 27IN CT1 + VIOLET PDP340H

## (undated) DEVICE — ENDO TROCAR FIRST ENTRY KII FIOS Z-THRD 05X100MM CTF03

## (undated) DEVICE — LUBRICANT INST ELECTROLUBE EL101

## (undated) DEVICE — ESU GROUND PAD ADULT REM W/15' CORD E7507DB

## (undated) DEVICE — CUSTOM PACK GEN MAJOR SBA5BGMHEA

## (undated) DEVICE — TUBING IRRIG TUR Y TYPE 96" LF 6543-01

## (undated) DEVICE — ESU PENCIL SMOKE EVAC W/ROCKER SWITCH 0703-047-000

## (undated) DEVICE — NEEDLE HYPO MAGELLAN SAFETY 22GA 1 1/2IN 8881850215

## (undated) DEVICE — PREP CHLORAPREP 26ML TINTED HI-LITE ORANGE 930815

## (undated) DEVICE — DAVINCI XI OBTURATOR BLADELESS 8MM 470359

## (undated) DEVICE — SYR 50ML LL W/O NDL 309653

## (undated) DEVICE — DRAPE LEGGINGS 8421

## (undated) DEVICE — SYRINGE 10ML FILL SALINE FLUSH STERILE 306553

## (undated) DEVICE — PREP DYNA-HEX 4% CHG SCRUB 4OZ BOTTLE MDS098710

## (undated) DEVICE — PAD POS XL 1X20X40IN PINK PIGAZZI

## (undated) DEVICE — BLADE KNIFE SURG 11 371111

## (undated) DEVICE — SUTURE VICRYL+ 2-0 27IN CT-1 UND VCP259H

## (undated) DEVICE — SYR 20ML LL W/O NDL 302830

## (undated) DEVICE — BLADE KNIFE SURG 10 371110

## (undated) DEVICE — SUCTION MANIFOLD NEPTUNE 2 SYS 1 PORT 702-025-000

## (undated) DEVICE — TUBING FILTER TRI-LUMEN AIRSEAL ASC-EVAC1

## (undated) DEVICE — DRAPE SHEET REV FOLD 3/4 9349

## (undated) DEVICE — DRAIN BLAKE 19FR SIL 2231

## (undated) DEVICE — DRAPE LAP/CHOLE W/O POUCH 89225

## (undated) DEVICE — DAVINCI HOT SHEARS TIP COVER  400180

## (undated) DEVICE — RETR ELEV / UTERINE MANIPULATOR V-CARE LG CUP 60-6085-202A

## (undated) DEVICE — SU DERMABOND ADVANCED .7ML DNX12

## (undated) DEVICE — DAVINCI XI DRAPE ARM 470015

## (undated) DEVICE — ENDO OBTURATOR ACCESS PORT BLADELESS 8X100MM IAS8-100LP

## (undated) DEVICE — GLOVE BIOGEL PI INDICATOR 8.0 LF 41680

## (undated) DEVICE — ENDO TROCAR SLEEVE KII Z-THREADED 05X100MM CTS02

## (undated) DEVICE — SUTURE SILK 0 TIES 30IN SA86G

## (undated) DEVICE — CUSTOM PACK TVT CUSTOM SOT5BTVHEA

## (undated) DEVICE — CUSTOM PACK DA VINCI GYN SMA5BDVHEA

## (undated) DEVICE — CUSTOM PACK LAP CHOLE SBA5BLCHEA

## (undated) DEVICE — SOL WATER IRRIG 1000ML BOTTLE 2F7114

## (undated) DEVICE — SURGICEL ENDOSCOPIC APPLICATOR FOR ORC POWDER 3123SPEA

## (undated) DEVICE — PROTECTOR ARM STANDARD ONE STEP

## (undated) DEVICE — SU SILK 2-0 TIE 18' A185H

## (undated) DEVICE — SU WND CLOSURE VLOC 180 ABS 0 12" GS-21 VLOCL0316

## (undated) DEVICE — SYR 10ML FINGER CONTROL W/O NDL 309695

## (undated) DEVICE — SOL NACL 0.9% IRRIG 1000ML BOTTLE 2F7124

## (undated) DEVICE — SUTURE MONOCRYL+ 4-0 PS-2 27IN MCP426H

## (undated) DEVICE — PACK TRENGUARD 450 PROCEDURAL 2065406

## (undated) DEVICE — SUCTION STRYKERFLOW II 250-070-500

## (undated) DEVICE — TUBING SMOKE EVAC PNEUMOCLEAR HIGH FLOW 0620050250

## (undated) DEVICE — DRAPE POUCH INSTRUMENT 3 POCKET 1018L

## (undated) DEVICE — ENDO SHEARS RENEW LAP ENDOCUT SCISSOR TIP 16.5MM 3142

## (undated) DEVICE — NDL INSUFFLATION 13GA 120MM C2201

## (undated) DEVICE — SUTURE VICRYL+ 2-0 27IN SH UND VCP417H

## (undated) DEVICE — SYR 50ML SLIP TIP W/O NDL 309654

## (undated) DEVICE — SU SILK 3-0 SH CR 8X18" C013D

## (undated) RX ORDER — DEXAMETHASONE SODIUM PHOSPHATE 10 MG/ML
INJECTION, SOLUTION INTRAMUSCULAR; INTRAVENOUS
Status: DISPENSED
Start: 2024-04-29

## (undated) RX ORDER — LIDOCAINE HYDROCHLORIDE 10 MG/ML
INJECTION, SOLUTION EPIDURAL; INFILTRATION; INTRACAUDAL; PERINEURAL
Status: DISPENSED
Start: 2024-04-29

## (undated) RX ORDER — LIDOCAINE HYDROCHLORIDE 10 MG/ML
INJECTION, SOLUTION EPIDURAL; INFILTRATION; INTRACAUDAL; PERINEURAL
Status: DISPENSED
Start: 2024-04-17

## (undated) RX ORDER — BUPIVACAINE HYDROCHLORIDE 2.5 MG/ML
INJECTION, SOLUTION EPIDURAL; INFILTRATION; INTRACAUDAL
Status: DISPENSED
Start: 2024-04-22

## (undated) RX ORDER — DEXAMETHASONE SODIUM PHOSPHATE 10 MG/ML
INJECTION, SOLUTION INTRAMUSCULAR; INTRAVENOUS
Status: DISPENSED
Start: 2024-04-17

## (undated) RX ORDER — PROPOFOL 10 MG/ML
INJECTION, EMULSION INTRAVENOUS
Status: DISPENSED
Start: 2024-04-17

## (undated) RX ORDER — GINSENG 100 MG
CAPSULE ORAL
Status: DISPENSED
Start: 2024-04-29

## (undated) RX ORDER — SODIUM CHLORIDE 9 MG/ML
INJECTION, SOLUTION INTRAVENOUS
Status: DISPENSED
Start: 2023-11-10

## (undated) RX ORDER — KETOROLAC TROMETHAMINE 30 MG/ML
INJECTION, SOLUTION INTRAMUSCULAR; INTRAVENOUS
Status: DISPENSED
Start: 2024-04-17

## (undated) RX ORDER — FENTANYL CITRATE 50 UG/ML
INJECTION, SOLUTION INTRAMUSCULAR; INTRAVENOUS
Status: DISPENSED
Start: 2023-11-10

## (undated) RX ORDER — CEFAZOLIN SODIUM 1 G/3ML
INJECTION, POWDER, FOR SOLUTION INTRAMUSCULAR; INTRAVENOUS
Status: DISPENSED
Start: 2024-04-23

## (undated) RX ORDER — ONDANSETRON 2 MG/ML
INJECTION INTRAMUSCULAR; INTRAVENOUS
Status: DISPENSED
Start: 2024-04-17

## (undated) RX ORDER — ONDANSETRON 2 MG/ML
INJECTION INTRAMUSCULAR; INTRAVENOUS
Status: DISPENSED
Start: 2024-04-29

## (undated) RX ORDER — FENTANYL CITRATE 50 UG/ML
INJECTION, SOLUTION INTRAMUSCULAR; INTRAVENOUS
Status: DISPENSED
Start: 2024-04-22

## (undated) RX ORDER — FENTANYL CITRATE 50 UG/ML
INJECTION, SOLUTION INTRAMUSCULAR; INTRAVENOUS
Status: DISPENSED
Start: 2024-04-17

## (undated) RX ORDER — PROPOFOL 10 MG/ML
INJECTION, EMULSION INTRAVENOUS
Status: DISPENSED
Start: 2024-04-29

## (undated) RX ORDER — LIDOCAINE HYDROCHLORIDE 10 MG/ML
INJECTION, SOLUTION EPIDURAL; INFILTRATION; INTRACAUDAL; PERINEURAL
Status: DISPENSED
Start: 2024-04-22

## (undated) RX ORDER — LIDOCAINE HYDROCHLORIDE 10 MG/ML
INJECTION, SOLUTION EPIDURAL; INFILTRATION; INTRACAUDAL; PERINEURAL
Status: DISPENSED
Start: 2023-11-10

## (undated) RX ORDER — PROPOFOL 10 MG/ML
INJECTION, EMULSION INTRAVENOUS
Status: DISPENSED
Start: 2024-04-22

## (undated) RX ORDER — EPHEDRINE SULFATE 50 MG/ML
INJECTION, SOLUTION INTRAMUSCULAR; INTRAVENOUS; SUBCUTANEOUS
Status: DISPENSED
Start: 2024-04-29

## (undated) RX ORDER — BUPIVACAINE HYDROCHLORIDE 2.5 MG/ML
INJECTION, SOLUTION EPIDURAL; INFILTRATION; INTRACAUDAL
Status: DISPENSED
Start: 2024-04-17

## (undated) RX ORDER — FENTANYL CITRATE 50 UG/ML
INJECTION, SOLUTION INTRAMUSCULAR; INTRAVENOUS
Status: DISPENSED
Start: 2024-04-29